# Patient Record
Sex: FEMALE | Race: WHITE | NOT HISPANIC OR LATINO | Employment: UNEMPLOYED | URBAN - METROPOLITAN AREA
[De-identification: names, ages, dates, MRNs, and addresses within clinical notes are randomized per-mention and may not be internally consistent; named-entity substitution may affect disease eponyms.]

---

## 2024-01-03 ENCOUNTER — TELEPHONE (OUTPATIENT)
Dept: FAMILY MEDICINE CLINIC | Facility: CLINIC | Age: 29
End: 2024-01-03

## 2024-01-03 NOTE — TELEPHONE ENCOUNTER
Caller: Chetna Saxena    Relationship: Self    Best call back number: [phone]     What is the medical concern/diagnosis: SKIN CONCERN    What specialty or service is being requested: REFERRAL TO GENERAL SURGEON FOR DERMATOLOGY ISSUE     What is the provider, practice or medical service name: UNKNOWN    What is the office location: LOCAL PREFERRED IF POSSIBLE      PATIENT STATES THAT SHE WENT TO HER DERMATOLOGIST AND THEY TOLD HER SHE NEEDS A REFERRAL TO A GENERAL SURGEON FOR HER DERMATOLOGY ISSUE.

## 2024-01-09 ENCOUNTER — OFFICE VISIT (OUTPATIENT)
Dept: FAMILY MEDICINE CLINIC | Facility: CLINIC | Age: 29
End: 2024-01-09
Payer: COMMERCIAL

## 2024-01-09 VITALS
BODY MASS INDEX: 35.69 KG/M2 | TEMPERATURE: 98.5 F | DIASTOLIC BLOOD PRESSURE: 69 MMHG | HEIGHT: 65 IN | OXYGEN SATURATION: 98 % | SYSTOLIC BLOOD PRESSURE: 125 MMHG | HEART RATE: 89 BPM | WEIGHT: 214.2 LBS | RESPIRATION RATE: 18 BRPM

## 2024-01-09 DIAGNOSIS — E66.01 CLASS 2 SEVERE OBESITY WITH SERIOUS COMORBIDITY AND BODY MASS INDEX (BMI) OF 35.0 TO 35.9 IN ADULT, UNSPECIFIED OBESITY TYPE: ICD-10-CM

## 2024-01-09 DIAGNOSIS — L73.2 HIDRADENITIS SUPPURATIVA: ICD-10-CM

## 2024-01-09 DIAGNOSIS — E53.8 B12 DEFICIENCY: ICD-10-CM

## 2024-01-09 DIAGNOSIS — L40.9 PSORIASIS: ICD-10-CM

## 2024-01-09 DIAGNOSIS — E55.9 VITAMIN D DEFICIENCY: ICD-10-CM

## 2024-01-09 DIAGNOSIS — L65.9 HAIR LOSS: ICD-10-CM

## 2024-01-09 DIAGNOSIS — E88.810 METABOLIC SYNDROME: Primary | ICD-10-CM

## 2024-01-09 LAB
25(OH)D3 SERPL-MCNC: 18.4 NG/ML (ref 30–100)
ALBUMIN SERPL-MCNC: 4.2 G/DL (ref 3.5–5.2)
ALBUMIN/GLOB SERPL: 1.4 G/DL
ALP SERPL-CCNC: 64 U/L (ref 39–117)
ALT SERPL W P-5'-P-CCNC: 35 U/L (ref 1–33)
ANION GAP SERPL CALCULATED.3IONS-SCNC: 6.5 MMOL/L (ref 5–15)
AST SERPL-CCNC: 21 U/L (ref 1–32)
BASOPHILS # BLD AUTO: 0.04 10*3/MM3 (ref 0–0.2)
BASOPHILS NFR BLD AUTO: 0.6 % (ref 0–1.5)
BILIRUB SERPL-MCNC: <0.2 MG/DL (ref 0–1.2)
BUN SERPL-MCNC: 10 MG/DL (ref 6–20)
BUN/CREAT SERPL: 17.5 (ref 7–25)
CALCIUM SPEC-SCNC: 9.7 MG/DL (ref 8.6–10.5)
CHLORIDE SERPL-SCNC: 107 MMOL/L (ref 98–107)
CHOLEST SERPL-MCNC: 161 MG/DL (ref 0–200)
CO2 SERPL-SCNC: 22.5 MMOL/L (ref 22–29)
CREAT SERPL-MCNC: 0.57 MG/DL (ref 0.57–1)
DEPRECATED RDW RBC AUTO: 36.6 FL (ref 37–54)
EGFRCR SERPLBLD CKD-EPI 2021: 127.1 ML/MIN/1.73
EOSINOPHIL # BLD AUTO: 0.22 10*3/MM3 (ref 0–0.4)
EOSINOPHIL NFR BLD AUTO: 3.1 % (ref 0.3–6.2)
ERYTHROCYTE [DISTWIDTH] IN BLOOD BY AUTOMATED COUNT: 11.5 % (ref 12.3–15.4)
FERRITIN SERPL-MCNC: 96.6 NG/ML (ref 13–150)
FOLATE SERPL-MCNC: 8.4 NG/ML (ref 4.78–24.2)
FSH SERPL-ACNC: 5.18 MIU/ML
GLOBULIN UR ELPH-MCNC: 3.1 GM/DL
GLUCOSE SERPL-MCNC: 77 MG/DL (ref 65–99)
HCT VFR BLD AUTO: 38.1 % (ref 34–46.6)
HDLC SERPL-MCNC: 44 MG/DL (ref 40–60)
HGB BLD-MCNC: 12.6 G/DL (ref 12–15.9)
IMM GRANULOCYTES # BLD AUTO: 0.04 10*3/MM3 (ref 0–0.05)
IMM GRANULOCYTES NFR BLD AUTO: 0.6 % (ref 0–0.5)
IRON 24H UR-MRATE: 81 MCG/DL (ref 37–145)
IRON SATN MFR SERPL: 25 % (ref 20–50)
LDLC SERPL CALC-MCNC: 103 MG/DL (ref 0–100)
LDLC/HDLC SERPL: 2.33 {RATIO}
LH SERPL-ACNC: 16.9 MIU/ML
LYMPHOCYTES # BLD AUTO: 2.22 10*3/MM3 (ref 0.7–3.1)
LYMPHOCYTES NFR BLD AUTO: 31.4 % (ref 19.6–45.3)
MCH RBC QN AUTO: 29.5 PG (ref 26.6–33)
MCHC RBC AUTO-ENTMCNC: 33.1 G/DL (ref 31.5–35.7)
MCV RBC AUTO: 89.2 FL (ref 79–97)
MONOCYTES # BLD AUTO: 0.61 10*3/MM3 (ref 0.1–0.9)
MONOCYTES NFR BLD AUTO: 8.6 % (ref 5–12)
NEUTROPHILS NFR BLD AUTO: 3.94 10*3/MM3 (ref 1.7–7)
NEUTROPHILS NFR BLD AUTO: 55.7 % (ref 42.7–76)
NRBC BLD AUTO-RTO: 0 /100 WBC (ref 0–0.2)
PLATELET # BLD AUTO: 306 10*3/MM3 (ref 140–450)
PMV BLD AUTO: 10.1 FL (ref 6–12)
POTASSIUM SERPL-SCNC: 4.3 MMOL/L (ref 3.5–5.2)
PROLACTIN SERPL-MCNC: 12.6 NG/ML (ref 4.79–23.3)
PROT SERPL-MCNC: 7.3 G/DL (ref 6–8.5)
RBC # BLD AUTO: 4.27 10*6/MM3 (ref 3.77–5.28)
SODIUM SERPL-SCNC: 136 MMOL/L (ref 136–145)
T3FREE SERPL-MCNC: 3.59 PG/ML (ref 2–4.4)
T4 FREE SERPL-MCNC: 1.24 NG/DL (ref 0.93–1.7)
TIBC SERPL-MCNC: 320 MCG/DL (ref 298–536)
TRANSFERRIN SERPL-MCNC: 215 MG/DL (ref 200–360)
TRIGL SERPL-MCNC: 72 MG/DL (ref 0–150)
TSH SERPL DL<=0.05 MIU/L-ACNC: 1.03 UIU/ML (ref 0.27–4.2)
VIT B12 BLD-MCNC: 265 PG/ML (ref 211–946)
VLDLC SERPL-MCNC: 14 MG/DL (ref 5–40)
WBC NRBC COR # BLD AUTO: 7.07 10*3/MM3 (ref 3.4–10.8)

## 2024-01-09 PROCEDURE — 82728 ASSAY OF FERRITIN: CPT | Performed by: NURSE PRACTITIONER

## 2024-01-09 PROCEDURE — 83540 ASSAY OF IRON: CPT | Performed by: NURSE PRACTITIONER

## 2024-01-09 PROCEDURE — 80053 COMPREHEN METABOLIC PANEL: CPT | Performed by: NURSE PRACTITIONER

## 2024-01-09 PROCEDURE — 83525 ASSAY OF INSULIN: CPT | Performed by: NURSE PRACTITIONER

## 2024-01-09 PROCEDURE — 83002 ASSAY OF GONADOTROPIN (LH): CPT | Performed by: NURSE PRACTITIONER

## 2024-01-09 PROCEDURE — 80061 LIPID PANEL: CPT | Performed by: NURSE PRACTITIONER

## 2024-01-09 PROCEDURE — 83527 ASSAY OF INSULIN: CPT | Performed by: NURSE PRACTITIONER

## 2024-01-09 PROCEDURE — 86376 MICROSOMAL ANTIBODY EACH: CPT | Performed by: NURSE PRACTITIONER

## 2024-01-09 PROCEDURE — 84439 ASSAY OF FREE THYROXINE: CPT | Performed by: NURSE PRACTITIONER

## 2024-01-09 PROCEDURE — 82607 VITAMIN B-12: CPT | Performed by: NURSE PRACTITIONER

## 2024-01-09 PROCEDURE — 82746 ASSAY OF FOLIC ACID SERUM: CPT | Performed by: NURSE PRACTITIONER

## 2024-01-09 PROCEDURE — 84146 ASSAY OF PROLACTIN: CPT | Performed by: NURSE PRACTITIONER

## 2024-01-09 PROCEDURE — 84466 ASSAY OF TRANSFERRIN: CPT | Performed by: NURSE PRACTITIONER

## 2024-01-09 PROCEDURE — 84443 ASSAY THYROID STIM HORMONE: CPT | Performed by: NURSE PRACTITIONER

## 2024-01-09 PROCEDURE — 85025 COMPLETE CBC W/AUTO DIFF WBC: CPT | Performed by: NURSE PRACTITIONER

## 2024-01-09 PROCEDURE — 83001 ASSAY OF GONADOTROPIN (FSH): CPT | Performed by: NURSE PRACTITIONER

## 2024-01-09 PROCEDURE — 84402 ASSAY OF FREE TESTOSTERONE: CPT | Performed by: NURSE PRACTITIONER

## 2024-01-09 PROCEDURE — 82306 VITAMIN D 25 HYDROXY: CPT | Performed by: NURSE PRACTITIONER

## 2024-01-09 PROCEDURE — 84481 FREE ASSAY (FT-3): CPT | Performed by: NURSE PRACTITIONER

## 2024-01-09 PROCEDURE — 86800 THYROGLOBULIN ANTIBODY: CPT | Performed by: NURSE PRACTITIONER

## 2024-01-09 PROCEDURE — 82627 DEHYDROEPIANDROSTERONE: CPT | Performed by: NURSE PRACTITIONER

## 2024-01-09 NOTE — PROGRESS NOTES
Chief Complaint  Obesity    Subjective          Cehtna Constance Woody presents to [facility]  History of Present Illness  She is here for referral to general surgery.  She has an area in the right axillary that dermatology told her she needed to get extracted.  She is on Humira for the psoriasis along with the hidradenitis.  She has also had weight gain since being placed back on the Humira in November.  She was down to 200 and now she is up to 214.  She has not changed her eating habits or her diet at all.  She did get  about a year ago but nothing is changed since then except for the Humira.  She is willing to learn about the gastric sleeve.  She is aware passport does not currently pay for any weight loss injections.  She has been on phentermine in the past and did lose weight but gained it back.  She is not smoking but she is using a 2% nicotine vapes.    She is also having a lot of hair loss.  She is is having the hair loss more at the back of the scalp.  She does have a history of B12 and D deficiency.  She also has a history of anemia in the past.  She has not had a cycle since 2021.  Depression: Not at risk (1/9/2024)    PHQ-2     PHQ-2 Score: 0    and 1/9/2024    Class 2 Severe Obesity (BMI >=35 and <=39.9). Obesity-related health conditions include the following:  derm issues . Obesity is worsening. BMI is is above average; BMI management plan is completed. We discussed low calorie, low carb based diet program, portion control, increasing exercise, and consulting a Bariatric surgeon.         Allergies  Erythromycin and Sulfamethoxazole-trimethoprim    Social History     Tobacco Use    Smoking status: Every Day     Packs/day: 1.00     Years: 10.00     Additional pack years: 0.00     Total pack years: 10.00     Types: Cigarettes     Start date: 2010    Smokeless tobacco: Never   Vaping Use    Vaping Use: Never used   Substance Use Topics    Alcohol use: Yes     Comment:  "Social     Drug use: Never       Family History   Problem Relation Age of Onset    Anxiety disorder Mother     Hypothyroidism Mother     Heart disease Mother     Other Father         Renal Calculus    Anxiety disorder Sister     Hypothyroidism Sister     Hypothyroidism Maternal Grandmother     Diabetes type II Paternal Grandmother     Diabetes type II Paternal Grandfather     Hyperlipidemia Paternal Grandfather     Hypertension Paternal Grandfather     Hypothyroidism Other         Health Maintenance Due   Topic Date Due    PAP SMEAR  06/24/2021    BMI FOLLOWUP  12/14/2023        Immunization History   Administered Date(s) Administered    COVID-19 (MODERNA) 1st,2nd,3rd Dose Monovalent 04/30/2021, 05/28/2021    DTP / HiB 01/29/1996    DTaP, Unspecified 07/29/1999    Fluzone (or Fluarix & Flulaval for VFC) >6mos 11/11/2014    Hep A, 2 Dose 02/13/2007    Hep B, Adolescent or Pediatric 08/13/1996    Influenza TIV (IM) 11/11/2014    MMR 07/29/1999, 08/30/1999    Meningococcal Conjugate 02/13/2007    OPV 01/29/1996, 07/29/1999    PEDS-Pneumococcal Conjugate (PCV7) 11/11/2014    Pneumococcal Conjugate 13-Valent (PCV13) 11/11/2014    Tdap 12/27/2021    Varicella 05/18/2001, 02/13/2007       Review of Systems   Constitutional:  Positive for unexpected weight gain. Negative for fatigue.   Respiratory:  Negative for cough and shortness of breath.    Cardiovascular:  Negative for chest pain.   Gastrointestinal:  Negative for diarrhea, nausea and vomiting.        Objective       Vitals:    01/09/24 0914   BP: 125/69   Pulse: 89   Resp: 18   Temp: 98.5 °F (36.9 °C)   SpO2: 98%   Weight: 97.2 kg (214 lb 3.2 oz)   Height: 165.1 cm (65\")       Body mass index is 35.64 kg/m².         Physical Exam  Vitals reviewed.   Constitutional:       Appearance: Normal appearance. She is well-developed. She is obese.   Cardiovascular:      Rate and Rhythm: Normal rate and regular rhythm.      Heart sounds: Normal heart sounds. No murmur " heard.  Pulmonary:      Effort: Pulmonary effort is normal.      Breath sounds: Normal breath sounds.   Neurological:      Mental Status: She is alert and oriented to person, place, and time.      Cranial Nerves: No cranial nerve deficit.      Motor: No weakness.   Psychiatric:         Mood and Affect: Mood and affect normal.             Result Review :     The following data was reviewed by: PHYLICIA Pat on 01/09/2024:    Common Labs   Common labs          2/14/2023    10:22   Common Labs   Glucose 65    BUN 15    Creatinine 0.61    Sodium 137    Potassium 4.3    Chloride 103    Calcium 9.4    Albumin 4.3    Total Bilirubin <0.2    Alkaline Phosphatase 60    AST (SGOT) 22    ALT (SGPT) 26    WBC 6.33    Hemoglobin 13.7    Hematocrit 41.1    Platelets 237    Total Cholesterol 142    Triglycerides 84    HDL Cholesterol 46    LDL Cholesterol  80                     Assessment and Plan      Diagnoses and all orders for this visit:    1. Metabolic syndrome (Primary)  -     Comprehensive Metabolic Panel  -     CBC & Differential  -     TSH  -     Lipid Panel  -     DHEA-sulfate  -     Follicle stimulating hormone  -     Luteinizing hormone  -     Prolactin  -     Testosterone Free Direct  -     Insulin, Free & Total, Serum    2. Hidradenitis suppurativa  -     Ambulatory Referral to General Surgery    3. Psoriasis  -     Ambulatory Referral to General Surgery    4. Class 2 severe obesity with serious comorbidity and body mass index (BMI) of 35.0 to 35.9 in adult, unspecified obesity type    5. Hair loss  -     Vitamin D,25-Hydroxy  -     Vitamin B12 & Folate  -     T4, Free  -     T3, Free  -     Thyroid Antibodies  -     DHEA-sulfate  -     Follicle stimulating hormone  -     Luteinizing hormone  -     Prolactin  -     Testosterone Free Direct    6. Vitamin D deficiency  -     Vitamin D,25-Hydroxy    7. B12 deficiency  -     Vitamin B12 & Folate  -     Ferritin  -     Iron Profile            Follow Up      Return if symptoms worsen or fail to improve.  We will do a consultation with general surgery for the axillary area.  She will do the bariatric video and go from there to see if she qualifies for bariatric surgery.  We did discuss that she would need to be on a 1400 to 1600-calorie diet with exercising 10 to 15 minutes a day other that is a brisk walk or on the treadmill to incorporate exercise.  We will check labs for the hair loss.  Continue seeing dermatology.  Call with questions or concerns.  Patient was given instructions and counseling regarding her condition or for health maintenance advice. Please see specific information pulled into the AVS if appropriate.     Parts of this note are electronic transcriptions/translations of spoken language to printed text using the Dragon Dictation system.          Pallavi Koch, APRN  01/09/2024

## 2024-01-10 LAB
DHEA-S SERPL-MCNC: 189 UG/DL (ref 84.8–378)
THYROGLOB AB SERPL-ACNC: <1 IU/ML (ref 0–0.9)
THYROPEROXIDASE AB SERPL-ACNC: <9 IU/ML (ref 0–34)

## 2024-01-11 RX ORDER — ADALIMUMAB 80MG/0.8ML
KIT SUBCUTANEOUS
COMMUNITY
Start: 2023-12-26

## 2024-01-12 ENCOUNTER — OFFICE VISIT (OUTPATIENT)
Dept: SURGERY | Facility: CLINIC | Age: 29
End: 2024-01-12
Payer: COMMERCIAL

## 2024-01-12 VITALS — HEIGHT: 65 IN | RESPIRATION RATE: 16 BRPM | WEIGHT: 212 LBS | BODY MASS INDEX: 35.32 KG/M2

## 2024-01-12 DIAGNOSIS — L73.2 HIDRADENITIS SUPPURATIVA: ICD-10-CM

## 2024-01-12 DIAGNOSIS — Z72.0 TOBACCO USE: Primary | ICD-10-CM

## 2024-01-12 PROCEDURE — 1160F RVW MEDS BY RX/DR IN RCRD: CPT | Performed by: SURGERY

## 2024-01-12 PROCEDURE — 1159F MED LIST DOCD IN RCRD: CPT | Performed by: SURGERY

## 2024-01-12 PROCEDURE — 99203 OFFICE O/P NEW LOW 30 MIN: CPT | Performed by: SURGERY

## 2024-01-12 RX ORDER — SODIUM CHLORIDE 0.9 % (FLUSH) 0.9 %
10 SYRINGE (ML) INJECTION EVERY 12 HOURS SCHEDULED
OUTPATIENT
Start: 2024-01-12

## 2024-01-12 RX ORDER — ONDANSETRON 2 MG/ML
4 INJECTION INTRAMUSCULAR; INTRAVENOUS EVERY 6 HOURS PRN
OUTPATIENT
Start: 2024-01-12

## 2024-01-12 RX ORDER — SODIUM CHLORIDE 0.9 % (FLUSH) 0.9 %
10 SYRINGE (ML) INJECTION AS NEEDED
OUTPATIENT
Start: 2024-01-12

## 2024-01-12 RX ORDER — SODIUM CHLORIDE, SODIUM LACTATE, POTASSIUM CHLORIDE, CALCIUM CHLORIDE 600; 310; 30; 20 MG/100ML; MG/100ML; MG/100ML; MG/100ML
70 INJECTION, SOLUTION INTRAVENOUS CONTINUOUS
OUTPATIENT
Start: 2024-01-12

## 2024-01-12 RX ORDER — SODIUM CHLORIDE 9 MG/ML
40 INJECTION, SOLUTION INTRAVENOUS AS NEEDED
OUTPATIENT
Start: 2024-01-12

## 2024-01-12 NOTE — H&P (VIEW-ONLY)
Inpatient History and Physical Surgical Orders    Preadmission Location:   Preadmission Time:  Facility:  Surgery Date:  Surgery Time:  Preadmission Test date:     Chief Complaint  Outpatient History and Physical / Surgical Orders    Primary Care Provider: Pallavi Koch APRN    Referring Provider: Pallavi Koch, *    Subjective      Patient Name: Chetna Woody : 1995    HPI  The patient is a 28-year-old female who presents with chronic problems with right axillary hidradenitis suppurativa.  She has been on some Humira recently but continues to have quite a bit of drainage and discomfort.  She is also had steroid pulses without significant improvement in the wound drainage.    Past History:  Medical History: has a past medical history of Amenorrhea, Anemia, Boils, Congenital meatal stenosis, Constipation, Glands swollen, Headache, Heart burn, Hidradenitis, Hidradenitis suppurativa, IC (interstitial cystitis), Insulin resistance (2018), and Psoriasis.   Surgical History: has a past surgical history that includes Adenoidectomy; Cervix surgery ();  section (); Cystoscopy; Salpingectomy (2021); Tonsillectomy (); Urethral Dilatation; Laser ablation (2021); [place] tooth extraction (2017); Tubal ligation; d & c hysteroscopy endometrial ablation; and Ablation colpoclesis.   Family History: family history includes Anxiety disorder in her mother and sister; Diabetes type II in her paternal grandfather and paternal grandmother; Heart disease in her mother; Hyperlipidemia in her paternal grandfather; Hypertension in her paternal grandfather; Hypothyroidism in her maternal grandmother, mother, sister, and another family member; Other in her father.   Social History: reports that she has been smoking cigarettes. She started smoking about 14 years ago. She has a 10.00 pack-year smoking history. She has never used smokeless tobacco. She reports current alcohol use.  "She reports that she does not use drugs.  Allergies: Erythromycin and Sulfamethoxazole-trimethoprim       Current Outpatient Medications:     Adalimumab (HUMIRA PEN SC), Inject 80 mcg under the skin into the appropriate area as directed Every 14 (Fourteen) Days., Disp: , Rfl:     Humira Pen 80 MG/0.8ML injection, INJECT 80MG (1 PEN) SUBCUTANEOUSLY EVERY 2 WEEKS, Disp: , Rfl:        Objective   Vital Signs:   Resp 16   Ht 165.1 cm (65\")   Wt 96.2 kg (212 lb)   BMI 35.28 kg/m²       Physical Exam  Vitals and nursing note reviewed.   Constitutional:       Appearance: Normal appearance. The patient is well-developed.   Cardiovascular:      Rate and Rhythm: Normal rate and regular rhythm.   Pulmonary:      Effort: Pulmonary effort is normal.      Breath sounds: Normal air entry.   Abdominal:      General: Bowel sounds are normal.      Palpations: Abdomen is soft.      Skin:     General: Skin is warm and dry.   Neurological:      Mental Status: The patient is alert and oriented to person, place, and time.      Motor: Motor function is intact.   Psychiatric:         Mood and Affect: Mood normal.   Axilla: She has extensive right axillary hidradenitis changes.    Result Review :               Assessment and Plan   Diagnoses and all orders for this visit:    1. Tobacco use (Primary)    2. Hidradenitis suppurativa  -     Case Request; Standing  -     Follow Anesthesia Guidelines / Protocol; Standing  -     Verify NPO Status; Standing  -     Obtain Informed Consent; Standing  -     Verify / Perform Chlorhexidine Skin Prep; Standing  -     Verify / Perform Chlorhexidine Skin Prep if Indicated (If Not Already Completed); Standing  -     Insert Peripheral IV; Standing  -     Saline Lock & Maintain IV Access; Standing  -     sodium chloride 0.9 % flush 10 mL  -     sodium chloride 0.9 % flush 10 mL  -     sodium chloride 0.9 % infusion 40 mL  -     lactated ringers infusion  -     ondansetron (ZOFRAN) injection 4 mg  -     " ceFAZolin (ANCEF) 2,000 mg in sodium chloride 0.9 % 100 mL IVPB  -     Case Request    I think it would be reasonable to consider a wide excision of this right axillary hidradenitis.  She appears to have multiple draining sinuses and suspect she has a lot of subcutaneous tunneling disease there.  I have told her that we will likely have to treat that with dressing changes after surgery and just plan on having it closed by secondary intention.  I have described the procedure to her as well as the risk and benefits and she is agreeable to proceeding.    I  Mart Frederick MD  01/12/2024

## 2024-01-12 NOTE — PROGRESS NOTES
Inpatient History and Physical Surgical Orders    Preadmission Location:   Preadmission Time:  Facility:  Surgery Date:  Surgery Time:  Preadmission Test date:     Chief Complaint  Outpatient History and Physical / Surgical Orders    Primary Care Provider: Pallavi Koch APRN    Referring Provider: Pallavi Koch, *    Subjective      Patient Name: Chetna Woody : 1995    HPI  The patient is a 28-year-old female who presents with chronic problems with right axillary hidradenitis suppurativa.  She has been on some Humira recently but continues to have quite a bit of drainage and discomfort.  She is also had steroid pulses without significant improvement in the wound drainage.    Past History:  Medical History: has a past medical history of Amenorrhea, Anemia, Boils, Congenital meatal stenosis, Constipation, Glands swollen, Headache, Heart burn, Hidradenitis, Hidradenitis suppurativa, IC (interstitial cystitis), Insulin resistance (2018), and Psoriasis.   Surgical History: has a past surgical history that includes Adenoidectomy; Cervix surgery ();  section (); Cystoscopy; Salpingectomy (2021); Tonsillectomy (); Urethral Dilatation; Laser ablation (2021); [place] tooth extraction (2017); Tubal ligation; d & c hysteroscopy endometrial ablation; and Ablation colpoclesis.   Family History: family history includes Anxiety disorder in her mother and sister; Diabetes type II in her paternal grandfather and paternal grandmother; Heart disease in her mother; Hyperlipidemia in her paternal grandfather; Hypertension in her paternal grandfather; Hypothyroidism in her maternal grandmother, mother, sister, and another family member; Other in her father.   Social History: reports that she has been smoking cigarettes. She started smoking about 14 years ago. She has a 10.00 pack-year smoking history. She has never used smokeless tobacco. She reports current alcohol use.  "She reports that she does not use drugs.  Allergies: Erythromycin and Sulfamethoxazole-trimethoprim       Current Outpatient Medications:     Adalimumab (HUMIRA PEN SC), Inject 80 mcg under the skin into the appropriate area as directed Every 14 (Fourteen) Days., Disp: , Rfl:     Humira Pen 80 MG/0.8ML injection, INJECT 80MG (1 PEN) SUBCUTANEOUSLY EVERY 2 WEEKS, Disp: , Rfl:        Objective   Vital Signs:   Resp 16   Ht 165.1 cm (65\")   Wt 96.2 kg (212 lb)   BMI 35.28 kg/m²       Physical Exam  Vitals and nursing note reviewed.   Constitutional:       Appearance: Normal appearance. The patient is well-developed.   Cardiovascular:      Rate and Rhythm: Normal rate and regular rhythm.   Pulmonary:      Effort: Pulmonary effort is normal.      Breath sounds: Normal air entry.   Abdominal:      General: Bowel sounds are normal.      Palpations: Abdomen is soft.      Skin:     General: Skin is warm and dry.   Neurological:      Mental Status: The patient is alert and oriented to person, place, and time.      Motor: Motor function is intact.   Psychiatric:         Mood and Affect: Mood normal.   Axilla: She has extensive right axillary hidradenitis changes.    Result Review :               Assessment and Plan   Diagnoses and all orders for this visit:    1. Tobacco use (Primary)    2. Hidradenitis suppurativa  -     Case Request; Standing  -     Follow Anesthesia Guidelines / Protocol; Standing  -     Verify NPO Status; Standing  -     Obtain Informed Consent; Standing  -     Verify / Perform Chlorhexidine Skin Prep; Standing  -     Verify / Perform Chlorhexidine Skin Prep if Indicated (If Not Already Completed); Standing  -     Insert Peripheral IV; Standing  -     Saline Lock & Maintain IV Access; Standing  -     sodium chloride 0.9 % flush 10 mL  -     sodium chloride 0.9 % flush 10 mL  -     sodium chloride 0.9 % infusion 40 mL  -     lactated ringers infusion  -     ondansetron (ZOFRAN) injection 4 mg  -     " ceFAZolin (ANCEF) 2,000 mg in sodium chloride 0.9 % 100 mL IVPB  -     Case Request    I think it would be reasonable to consider a wide excision of this right axillary hidradenitis.  She appears to have multiple draining sinuses and suspect she has a lot of subcutaneous tunneling disease there.  I have told her that we will likely have to treat that with dressing changes after surgery and just plan on having it closed by secondary intention.  I have described the procedure to her as well as the risk and benefits and she is agreeable to proceeding.    I  Mart Frederick MD  01/12/2024

## 2024-01-17 ENCOUNTER — ANESTHESIA EVENT (OUTPATIENT)
Dept: PERIOP | Facility: HOSPITAL | Age: 29
End: 2024-01-17
Payer: COMMERCIAL

## 2024-01-17 LAB
INSULIN FREE SERPL-ACNC: 9 UU/ML
INSULIN SERPL-ACNC: 9 UU/ML

## 2024-01-17 RX ORDER — UBIDECARENONE 75 MG
50 CAPSULE ORAL DAILY
COMMUNITY

## 2024-01-17 RX ORDER — MELATONIN
1000 DAILY
COMMUNITY

## 2024-01-17 NOTE — PRE-PROCEDURE INSTRUCTIONS
PATIENT INSTRUCTED TO BE:    - NOTHING TO EAT AFTER MIDNIGHT OR CHEW, EXCEPT CAN HAVE CLEAR LIQUIDS 2 HOURS PRIOR TO SURGERY ARRIVAL TIME     - TO HOLD ALL VITAMINS, SUPPLEMENTS, NSAIDS FOR ONE WEEK PRIOR TO THEIR SURGICAL PROCEDURE    - DO NOT TAKE __--------____ 7 DAYS PRIOR TO PROCEDURE PER ANESTHESIA RECOMMENDATIONS/INSTRUCTIONS     - INSTRUCTED PT TO USE SURGICAL SOAP 1 TIME THE NIGHT PRIOR TO SURGERY OR THE AM OF SURGERY.   USE SOAP FROM NECK TO TOES AVOID THEIR FACE, HAIR, AND PRIVATE PARTS. INSTRUCTED NO LOTIONS, JEWELRY, PIERCINGS, OR DEODORANT DAY OF SURGERY    - IF DIABETIC, CHECK BLOOD GLUCOSE IF LESS THAN 70 OR HAVING SYMPTOMS CALL THE PREOP AREA FOR INSTRUCTIONS ON AM OF SURGERY ([phone])    -INSTRUCTED TO TAKE THE FOLLOWING MEDICATIONS THE DAY OF SURGERY:        NONE      - DO NOT BRING ANY MEDICATIONS WITH YOU TO THE HOSPITAL THE DAY OF SURGERY, EXCEPT IF USE INHALERS. BRING INHALERS DAY OF SURGERY       - BRING CPAP OR BIPAP TO THE HOSPITAL ONLY IF ARE SPENDING THE NIGHT    - DO NOT SMOKE OR VAPE 24 HOURS PRIOR TO PROCEDURE PER ANESTHESIA REQUEST     -MAKE SURE YOU HAVE A RIDE HOME OR SOMEONE TO STAY WITH YOU THE DAY OF THE PROCEDURE AFTER YOU GO HOME    - FOLLOW ANY OTHER INSTRUCTIONS GIVEN TO YOU BY YOUR SURGEON'S OFFICE.     - PREADMISSION TESTING NURSE- OLLIE -[phone] IF HAVE ANY QUESTIONS     PATIENT PROVIDED THE NUMBER FOR PREOP SURGICAL DEPT IF HAD QUESTIONS AFTER HOURS PRIOR TO SURGERY ([phone])  INFORMED PT IF NO ANSWER, LEAVE A MESSAGE AND SOMEONE WILL RETURN THEIR CALL       PATIENT VERBALIZED UNDERSTANDING

## 2024-01-18 ENCOUNTER — ANESTHESIA (OUTPATIENT)
Dept: PERIOP | Facility: HOSPITAL | Age: 29
End: 2024-01-18
Payer: COMMERCIAL

## 2024-01-18 ENCOUNTER — HOSPITAL ENCOUNTER (OUTPATIENT)
Facility: HOSPITAL | Age: 29
Setting detail: HOSPITAL OUTPATIENT SURGERY
Discharge: HOME | End: 2024-01-18
Attending: SURGERY | Admitting: SURGERY
Payer: COMMERCIAL

## 2024-01-18 VITALS
HEIGHT: 65 IN | OXYGEN SATURATION: 98 % | SYSTOLIC BLOOD PRESSURE: 144 MMHG | WEIGHT: 214.29 LBS | TEMPERATURE: 98.8 F | RESPIRATION RATE: 19 BRPM | HEART RATE: 79 BPM | BODY MASS INDEX: 35.7 KG/M2 | DIASTOLIC BLOOD PRESSURE: 82 MMHG

## 2024-01-18 DIAGNOSIS — L73.2 HIDRADENITIS SUPPURATIVA: ICD-10-CM

## 2024-01-18 DIAGNOSIS — L73.2 HIDRADENITIS SUPPURATIVA: Primary | ICD-10-CM

## 2024-01-18 LAB — TESTOST FREE SERPL-MCNC: 1.2 PG/ML (ref 0–4.2)

## 2024-01-18 PROCEDURE — 25010000002 ONDANSETRON PER 1 MG: Performed by: NURSE ANESTHETIST, CERTIFIED REGISTERED

## 2024-01-18 PROCEDURE — 25010000002 BUPIVACAINE (PF) 0.25 % SOLUTION: Performed by: SURGERY

## 2024-01-18 PROCEDURE — 25010000002 DEXAMETHASONE PER 1 MG: Performed by: NURSE ANESTHETIST, CERTIFIED REGISTERED

## 2024-01-18 PROCEDURE — 25810000003 LACTATED RINGERS PER 1000 ML: Performed by: ANESTHESIOLOGY

## 2024-01-18 PROCEDURE — 25010000002 CEFAZOLIN IN DEXTROSE 2000 MG/ 100 ML SOLUTION: Performed by: SURGERY

## 2024-01-18 PROCEDURE — 88304 TISSUE EXAM BY PATHOLOGIST: CPT | Performed by: SURGERY

## 2024-01-18 PROCEDURE — 25010000002 HYDROMORPHONE 1 MG/ML SOLUTION: Performed by: NURSE ANESTHETIST, CERTIFIED REGISTERED

## 2024-01-18 PROCEDURE — 25010000002 FENTANYL CITRATE (PF) 50 MCG/ML SOLUTION: Performed by: NURSE ANESTHETIST, CERTIFIED REGISTERED

## 2024-01-18 PROCEDURE — 25010000002 DROPERIDOL PER 5 MG: Performed by: ANESTHESIOLOGY

## 2024-01-18 PROCEDURE — 25010000002 PROPOFOL 10 MG/ML EMULSION: Performed by: NURSE ANESTHETIST, CERTIFIED REGISTERED

## 2024-01-18 PROCEDURE — 25010000002 MIDAZOLAM PER 1MG: Performed by: ANESTHESIOLOGY

## 2024-01-18 PROCEDURE — 25010000002 KETOROLAC TROMETHAMINE PER 15 MG: Performed by: NURSE ANESTHETIST, CERTIFIED REGISTERED

## 2024-01-18 PROCEDURE — 11450 EXC SKN HDRDNT AX SMPL/NTRM: CPT | Performed by: SURGERY

## 2024-01-18 RX ORDER — SODIUM CHLORIDE, SODIUM LACTATE, POTASSIUM CHLORIDE, CALCIUM CHLORIDE 600; 310; 30; 20 MG/100ML; MG/100ML; MG/100ML; MG/100ML
9 INJECTION, SOLUTION INTRAVENOUS CONTINUOUS PRN
Status: DISCONTINUED | OUTPATIENT
Start: 2024-01-18 | End: 2024-01-18 | Stop reason: HOSPADM

## 2024-01-18 RX ORDER — ONDANSETRON 2 MG/ML
4 INJECTION INTRAMUSCULAR; INTRAVENOUS EVERY 6 HOURS PRN
Status: DISCONTINUED | OUTPATIENT
Start: 2024-01-18 | End: 2024-01-18 | Stop reason: HOSPADM

## 2024-01-18 RX ORDER — HYDROCODONE BITARTRATE AND ACETAMINOPHEN 5; 325 MG/1; MG/1
1 TABLET ORAL ONCE AS NEEDED
Status: DISCONTINUED | OUTPATIENT
Start: 2024-01-18 | End: 2024-01-18 | Stop reason: HOSPADM

## 2024-01-18 RX ORDER — BUPIVACAINE HYDROCHLORIDE 2.5 MG/ML
INJECTION, SOLUTION EPIDURAL; INFILTRATION; INTRACAUDAL AS NEEDED
Status: DISCONTINUED | OUTPATIENT
Start: 2024-01-18 | End: 2024-01-18 | Stop reason: HOSPADM

## 2024-01-18 RX ORDER — MAGNESIUM HYDROXIDE 1200 MG/15ML
LIQUID ORAL AS NEEDED
Status: DISCONTINUED | OUTPATIENT
Start: 2024-01-18 | End: 2024-01-18 | Stop reason: HOSPADM

## 2024-01-18 RX ORDER — SODIUM CHLORIDE 0.9 % (FLUSH) 0.9 %
10 SYRINGE (ML) INJECTION EVERY 12 HOURS SCHEDULED
Status: DISCONTINUED | OUTPATIENT
Start: 2024-01-18 | End: 2024-01-18 | Stop reason: HOSPADM

## 2024-01-18 RX ORDER — PROPOFOL 10 MG/ML
VIAL (ML) INTRAVENOUS AS NEEDED
Status: DISCONTINUED | OUTPATIENT
Start: 2024-01-18 | End: 2024-01-18 | Stop reason: SURG

## 2024-01-18 RX ORDER — ONDANSETRON 4 MG/1
4 TABLET, ORALLY DISINTEGRATING ORAL ONCE AS NEEDED
Status: DISCONTINUED | OUTPATIENT
Start: 2024-01-18 | End: 2024-01-18 | Stop reason: SDUPTHER

## 2024-01-18 RX ORDER — SODIUM CHLORIDE 0.9 % (FLUSH) 0.9 %
10 SYRINGE (ML) INJECTION AS NEEDED
Status: DISCONTINUED | OUTPATIENT
Start: 2024-01-18 | End: 2024-01-18 | Stop reason: HOSPADM

## 2024-01-18 RX ORDER — MIDAZOLAM HYDROCHLORIDE 2 MG/2ML
2 INJECTION, SOLUTION INTRAMUSCULAR; INTRAVENOUS ONCE
Status: COMPLETED | OUTPATIENT
Start: 2024-01-18 | End: 2024-01-18

## 2024-01-18 RX ORDER — OXYCODONE HYDROCHLORIDE 5 MG/1
5 TABLET ORAL
Status: DISCONTINUED | OUTPATIENT
Start: 2024-01-18 | End: 2024-01-18 | Stop reason: HOSPADM

## 2024-01-18 RX ORDER — CEFAZOLIN SODIUM 2 G/100ML
2000 INJECTION, SOLUTION INTRAVENOUS ONCE
Status: COMPLETED | OUTPATIENT
Start: 2024-01-18 | End: 2024-01-18

## 2024-01-18 RX ORDER — SODIUM CHLORIDE, SODIUM LACTATE, POTASSIUM CHLORIDE, CALCIUM CHLORIDE 600; 310; 30; 20 MG/100ML; MG/100ML; MG/100ML; MG/100ML
70 INJECTION, SOLUTION INTRAVENOUS CONTINUOUS
Status: DISCONTINUED | OUTPATIENT
Start: 2024-01-18 | End: 2024-01-18 | Stop reason: HOSPADM

## 2024-01-18 RX ORDER — PROMETHAZINE HYDROCHLORIDE 12.5 MG/1
25 TABLET ORAL ONCE AS NEEDED
Status: DISCONTINUED | OUTPATIENT
Start: 2024-01-18 | End: 2024-01-18 | Stop reason: HOSPADM

## 2024-01-18 RX ORDER — MEPERIDINE HYDROCHLORIDE 25 MG/ML
12.5 INJECTION INTRAMUSCULAR; INTRAVENOUS; SUBCUTANEOUS
Status: DISCONTINUED | OUTPATIENT
Start: 2024-01-18 | End: 2024-01-18 | Stop reason: HOSPADM

## 2024-01-18 RX ORDER — ONDANSETRON 2 MG/ML
4 INJECTION INTRAMUSCULAR; INTRAVENOUS ONCE AS NEEDED
Status: DISCONTINUED | OUTPATIENT
Start: 2024-01-18 | End: 2024-01-18 | Stop reason: HOSPADM

## 2024-01-18 RX ORDER — KETOROLAC TROMETHAMINE 30 MG/ML
INJECTION, SOLUTION INTRAMUSCULAR; INTRAVENOUS AS NEEDED
Status: DISCONTINUED | OUTPATIENT
Start: 2024-01-18 | End: 2024-01-18 | Stop reason: SURG

## 2024-01-18 RX ORDER — PROMETHAZINE HYDROCHLORIDE 25 MG/1
25 SUPPOSITORY RECTAL ONCE AS NEEDED
Status: DISCONTINUED | OUTPATIENT
Start: 2024-01-18 | End: 2024-01-18 | Stop reason: HOSPADM

## 2024-01-18 RX ORDER — IBUPROFEN 600 MG/1
600 TABLET ORAL EVERY 6 HOURS PRN
Status: DISCONTINUED | OUTPATIENT
Start: 2024-01-18 | End: 2024-01-18 | Stop reason: HOSPADM

## 2024-01-18 RX ORDER — HYDROCODONE BITARTRATE AND ACETAMINOPHEN 5; 325 MG/1; MG/1
1 TABLET ORAL EVERY 6 HOURS PRN
Qty: 30 TABLET | Refills: 0 | Status: SHIPPED | OUTPATIENT
Start: 2024-01-18

## 2024-01-18 RX ORDER — DEXAMETHASONE SODIUM PHOSPHATE 4 MG/ML
INJECTION, SOLUTION INTRA-ARTICULAR; INTRALESIONAL; INTRAMUSCULAR; INTRAVENOUS; SOFT TISSUE AS NEEDED
Status: DISCONTINUED | OUTPATIENT
Start: 2024-01-18 | End: 2024-01-18 | Stop reason: SURG

## 2024-01-18 RX ORDER — DROPERIDOL 2.5 MG/ML
0.62 INJECTION, SOLUTION INTRAMUSCULAR; INTRAVENOUS ONCE
Status: COMPLETED | OUTPATIENT
Start: 2024-01-18 | End: 2024-01-18

## 2024-01-18 RX ORDER — SCOLOPAMINE TRANSDERMAL SYSTEM 1 MG/1
1 PATCH, EXTENDED RELEASE TRANSDERMAL ONCE
Status: DISCONTINUED | OUTPATIENT
Start: 2024-01-18 | End: 2024-01-18 | Stop reason: HOSPADM

## 2024-01-18 RX ORDER — ONDANSETRON 2 MG/ML
INJECTION INTRAMUSCULAR; INTRAVENOUS AS NEEDED
Status: DISCONTINUED | OUTPATIENT
Start: 2024-01-18 | End: 2024-01-18 | Stop reason: SURG

## 2024-01-18 RX ORDER — ACETAMINOPHEN 500 MG
1000 TABLET ORAL ONCE
Status: COMPLETED | OUTPATIENT
Start: 2024-01-18 | End: 2024-01-18

## 2024-01-18 RX ORDER — SODIUM CHLORIDE 9 MG/ML
40 INJECTION, SOLUTION INTRAVENOUS AS NEEDED
Status: DISCONTINUED | OUTPATIENT
Start: 2024-01-18 | End: 2024-01-18 | Stop reason: HOSPADM

## 2024-01-18 RX ORDER — FENTANYL CITRATE 50 UG/ML
INJECTION, SOLUTION INTRAMUSCULAR; INTRAVENOUS AS NEEDED
Status: DISCONTINUED | OUTPATIENT
Start: 2024-01-18 | End: 2024-01-18 | Stop reason: SURG

## 2024-01-18 RX ORDER — LIDOCAINE HYDROCHLORIDE 20 MG/ML
INJECTION, SOLUTION EPIDURAL; INFILTRATION; INTRACAUDAL; PERINEURAL AS NEEDED
Status: DISCONTINUED | OUTPATIENT
Start: 2024-01-18 | End: 2024-01-18 | Stop reason: SURG

## 2024-01-18 RX ADMIN — SCOPALAMINE 1 PATCH: 1 PATCH, EXTENDED RELEASE TRANSDERMAL at 09:35

## 2024-01-18 RX ADMIN — HYDROMORPHONE HYDROCHLORIDE 0.5 MG: 1 INJECTION, SOLUTION INTRAMUSCULAR; INTRAVENOUS; SUBCUTANEOUS at 10:53

## 2024-01-18 RX ADMIN — CEFAZOLIN SODIUM 2 G: 2 INJECTION, SOLUTION INTRAVENOUS at 09:42

## 2024-01-18 RX ADMIN — PROPOFOL 200 MG: 10 INJECTION, EMULSION INTRAVENOUS at 09:46

## 2024-01-18 RX ADMIN — DROPERIDOL 0.62 MG: 2.5 INJECTION, SOLUTION INTRAMUSCULAR; INTRAVENOUS at 10:49

## 2024-01-18 RX ADMIN — KETOROLAC TROMETHAMINE 30 MG: 30 INJECTION, SOLUTION INTRAMUSCULAR; INTRAVENOUS at 10:16

## 2024-01-18 RX ADMIN — FENTANYL CITRATE 50 MCG: 50 INJECTION, SOLUTION INTRAMUSCULAR; INTRAVENOUS at 09:43

## 2024-01-18 RX ADMIN — ONDANSETRON 4 MG: 2 INJECTION INTRAMUSCULAR; INTRAVENOUS at 10:40

## 2024-01-18 RX ADMIN — SODIUM CHLORIDE, POTASSIUM CHLORIDE, SODIUM LACTATE AND CALCIUM CHLORIDE 9 ML/HR: 600; 310; 30; 20 INJECTION, SOLUTION INTRAVENOUS at 08:30

## 2024-01-18 RX ADMIN — ACETAMINOPHEN 1000 MG: 500 TABLET ORAL at 08:29

## 2024-01-18 RX ADMIN — MIDAZOLAM HYDROCHLORIDE 2 MG: 1 INJECTION, SOLUTION INTRAMUSCULAR; INTRAVENOUS at 09:35

## 2024-01-18 RX ADMIN — FENTANYL CITRATE 50 MCG: 50 INJECTION, SOLUTION INTRAMUSCULAR; INTRAVENOUS at 09:50

## 2024-01-18 RX ADMIN — LIDOCAINE HYDROCHLORIDE 60 MG: 20 INJECTION, SOLUTION EPIDURAL; INFILTRATION; INTRACAUDAL; PERINEURAL at 09:43

## 2024-01-18 RX ADMIN — DEXAMETHASONE SODIUM PHOSPHATE 8 MG: 4 INJECTION, SOLUTION INTRAMUSCULAR; INTRAVENOUS at 09:49

## 2024-01-18 RX ADMIN — ONDANSETRON 4 MG: 2 INJECTION INTRAMUSCULAR; INTRAVENOUS at 10:01

## 2024-01-18 NOTE — ANESTHESIA PREPROCEDURE EVALUATION
Anesthesia Evaluation     Patient summary reviewed and Nursing notes reviewed   no history of anesthetic complications:   NPO Solid Status: > 8 hours  NPO Liquid Status: > 2 hours           Airway   Mallampati: II  TM distance: >3 FB  Neck ROM: full  No difficulty expected  Dental          Pulmonary - normal exam    breath sounds clear to auscultation  (+) a smoker Former,  Cardiovascular - normal exam  Exercise tolerance: good (4-7 METS)    Rhythm: regular  Rate: normal        Neuro/Psych  (+) psychiatric history Depression  GI/Hepatic/Renal/Endo    (+) obesity    Musculoskeletal     Abdominal    Substance History      OB/GYN          Other        ROS/Med Hx Other: PAT Nursing Notes unavailable.                Anesthesia Plan    ASA 2     general     (Patient understands anesthesia not responsible for dental damage.)  intravenous induction     Anesthetic plan, risks, benefits, and alternatives have been provided, discussed and informed consent has been obtained with: patient.    Plan discussed with CRNA.    CODE STATUS:

## 2024-01-18 NOTE — DISCHARGE INSTRUCTIONS
DISCHARGE INSTRUCTIONS  SURGICAL / AMBULATORY  PROCEDURES      For your surgery you had:  General anesthesia (you may have a sore throat for the first 24 hours)    Local anesthesia    You received a medicated patch for nausea prevention today (behind your ear). It is recommended that you remove it 24-48 hours post-operatively. It must be removed within 72 hours.     You may experience dizziness, drowsiness, or light-headedness for several hours following surgery/procedure.  Do not stay alone today or tonight.  Limit your activity for 24 hours.  Resume your diet slowly.  Follow whatever special dietary instructions you may have been given by your doctor.  You should not drive or operate machinery, drink alcohol, or sign legally binding documents for 24 hours or while you are taking pain medication.  Last dose of pain medication was given at:   .  NOTIFY YOUR DOCTOR IF YOU EXPERIENCE ANY OF THE FOLLOWING:  Temperature greater than 101 degrees Fahrenheit  Shaking Chills  Redness or excessive drainage from incision  Nausea, vomiting and/or pain that is not controlled by prescribed medications  Increase in bleeding or bleeding that is excessive  Unable to urinate in 6 hours after surgery  If unable to reach your doctor, please go to the closest Emergency Room    Apply an ice pack 24-48 hours.  Medications per physician instructions as indicated on Discharge Medication Information Sheet.  You should see   for follow-up care   on   .  Phone number:       SPECIAL INSTRUCTIONS:     Dr. Frey office is setting up dressing changes, will call with instructions

## 2024-01-18 NOTE — OP NOTE
AXILLARY NODE DISSECTION  Procedure Report    Patient Name:  Chetna Woody  YOB: 1995    Date of Surgery:  1/18/2024     Indications: The patient is a 28-year-old female that presented with chronic draining hidradenitis suppurativa of the right axilla.  She had multiple draining sinuses and the decision was made to proceed with a right axillary skin excision.    Pre-op Diagnosis: Right axillary hidradenitis    Post-Op Diagnosis: Same    Procedure/CPT® Codes:    Excision of right axillary hidradenitis    Staff:  Surgeon(s):  Mart Frederick MD         Anesthesia: General    Estimated Blood Loss: 2 mL    Implants:    Nothing was implanted during the procedure    Specimen:          Specimens       ID Source Type Tests Collected By Collected At Frozen?    A Axilla, Right Tissue TISSUE PATHOLOGY EXAM   Mart Frederick MD 1/18/24 1011 No    Description: right axillary hydradenitis                Findings: 6 x 8 cm skin and subcutaneous fat excision    Complications: None    Description of Procedure: The patient was taken the operating room and placed on the table in supine position.  After induction of general anesthesia her right axilla was prepped and draped sterilely.  She had multiple draining sinuses with some obviously draining pus.  We made an elliptical incision around the area with the obvious hidradenitis changes in this area was about 6 x 8 cm using a 15 blade scalpel.  I dissected down into the subcutaneous tissues and excised the skin ellipse was subcutaneous fat using cautery.  When this was removed we appeared to have removed all of the obvious sinuses and I saw no other subcutaneous tunneling going off in any direction.  Adequate hemostasis was achieved with cautery.  We irrigated out the wound with sterile saline.  The wound was then packed open with a saline moistened gauze and sterile dressings were applied.  She tolerated this well and was taken the postanesthesia recovery room  in stable condition.       was responsible for performing the following activities: Retraction, Suction, Irrigation, and Placing Dressing and their skilled assistance was necessary for the success of this case.    Mart Frederick MD     Date: 1/18/2024  Time: 10:20 EST

## 2024-01-18 NOTE — ANESTHESIA POSTPROCEDURE EVALUATION
Patient: Chetna Woody    Procedure Summary       Date: 01/18/24 Room / Location: [facility] OR  / [facility]    Anesthesia Start: 0942 Anesthesia Stop: 1033    Procedure: AXILLARY HIDRADENITIS EXCISION (Right: Axilla) Diagnosis:       Hidradenitis suppurativa      (Hidradenitis suppurativa [L73.2])    Surgeons: Mart Frederick MD Provider: Piyush Severino MD    Anesthesia Type: general ASA Status: 2            Anesthesia Type: general    Vitals  Vitals Value Taken Time   /80 01/18/24 1100   Temp 36.5 °C (97.7 °F) 01/18/24 1030   Pulse 83 01/18/24 1100   Resp 19 01/18/24 1030   SpO2 98 % 01/18/24 1100           Post Anesthesia Care and Evaluation    Patient location during evaluation: bedside  Patient participation: complete - patient participated  Level of consciousness: awake  Pain management: adequate    Airway patency: patent  PONV Status: none  Cardiovascular status: acceptable and stable  Respiratory status: acceptable  Hydration status: acceptable    Comments: An Anesthesiologist personally participated in the most demanding procedures (including induction and emergence if applicable) in the anesthesia plan, monitored the course of anesthesia administration at frequent intervals and remained physically present and available for immediate diagnosis and treatment of emergencies.

## 2024-01-19 ENCOUNTER — HOSPITAL ENCOUNTER (OUTPATIENT)
Dept: INFUSION THERAPY | Facility: HOSPITAL | Age: 29
Discharge: HOME | End: 2024-01-19
Admitting: NURSE PRACTITIONER
Payer: COMMERCIAL

## 2024-01-19 VITALS
HEART RATE: 78 BPM | OXYGEN SATURATION: 100 % | SYSTOLIC BLOOD PRESSURE: 122 MMHG | RESPIRATION RATE: 16 BRPM | DIASTOLIC BLOOD PRESSURE: 56 MMHG | TEMPERATURE: 98.5 F

## 2024-01-19 DIAGNOSIS — L73.2 HIDRADENITIS SUPPURATIVA: Primary | ICD-10-CM

## 2024-01-19 LAB
CYTO UR: NORMAL
LAB AP CASE REPORT: NORMAL
LAB AP CLINICAL INFORMATION: NORMAL
PATH REPORT.FINAL DX SPEC: NORMAL
PATH REPORT.GROSS SPEC: NORMAL

## 2024-01-19 PROCEDURE — 97602 WOUND(S) CARE NON-SELECTIVE: CPT

## 2024-01-19 PROCEDURE — G0463 HOSPITAL OUTPT CLINIC VISIT: HCPCS

## 2024-01-19 NOTE — NURSING NOTE
Call placed to Dr. Frederick concerning surgical wound. Ok to place wound vac to right axilla if able to get good seal with next recheck on Monday.   Cynthia Raya RN

## 2024-01-19 NOTE — SIGNIFICANT NOTE
Wound Eval / Progress Noted     [facility]     Patient Name: Chetna Woody  : 1995  MRN: [id number]  Today's Date: 2024                 Admit Date: 2024    Visit Dx:  No diagnosis found.      * No active hospital problems. *        Past Medical History:   Diagnosis Date    Amenorrhea     Anemia     ASYMPTOMATIC    Boils     Congenital meatal stenosis     Constipation     GERD (gastroesophageal reflux disease)     Glands swollen     Headache     Heart burn     Hidradenitis     Hidradenitis suppurativa     IC (interstitial cystitis)     Insulin resistance 2018    Psoriasis         Past Surgical History:   Procedure Laterality Date    ABLATION COLPOCLESIS      ADENOIDECTOMY      AXILLARY NODE DISSECTION Right 2024    Procedure: AXILLARY HIDRADENITIS EXCISION;  Surgeon: Mart Frederick MD;  [place] [facility] [place] [facility] [place]  Service: General;  Laterality: Right;    CERVIX SURGERY      cyst and scar tissue remover right cervical neck- LYMPH NODES REMOVED DELFIN     SECTION      baby girl    CYSTOSCOPY      D & C HYSTEROSCOPY ENDOMETRIAL ABLATION      LASER ABLATION  2021    Uterine    SALPINGECTOMY  2021    TONSILLECTOMY      TUBAL ABDOMINAL LIGATION      URETHRAL DILATATION      WISDOM TOOTH EXTRACTION  2017         Physical Assessment:  Wound Right mid axillary Incision (Active)   Wound Image   24 1205   Dressing Appearance intact;moist drainage 24 1205   Closure None 24 1205   Base red;moist;subcutaneous;yellow 24 1205   Red (%), Wound Tissue Color 50 24 1205   Yellow (%), Wound Tissue Color 50 24 1205   Periwound intact;redness;warm 24 1205   Periwound Temperature warm 24 1205   Periwound Skin Turgor soft 24 1205   Edges open 24 1205   Wound Length (cm) 11 cm 24 1205   Wound Width (cm) 5.3 cm 24 1205   Wound Depth (cm) 3.5 cm 24 1205   Wound Surface Area (cm^2) 58.3 cm^2 24 1205    Wound Volume (cm^3) 204.05 cm^3 01/19/24 1205   Drainage Characteristics/Odor serosanguineous 01/19/24 1205   Drainage Amount small;moderate 01/19/24 1205   Care, Wound cleansed with;irrigated with;sterile normal saline 01/19/24 1205   Dressing Care dressing applied;dressing moistened;gauze, [id number];abdominal pad 01/19/24 1205   Periwound Care absorptive dressing applied 01/19/24 1205        Wound Check / Follow-up:  Patient seen today for wound evaluation / dressing change. Patient was seen today after having a surgical incision to right axilla yesterday 01/18/24 due to HS. She states she has had HS since she was 7 and has numerous debridements but never an extensive surgical incision.     She presents today with intact surgical dressing that has moderate amount of drainage but is not saturated. After removal, Irrigated wound with NS and gauze. Small amount of drainage noted during this time. Wound base is red and moist with yellow subcutaneous tissue noted. Wound filled with NS moistened gauze and then covered with ABD pad. Patient to have daily dressing changes and then can re-evaluate on Monday for possible wound vac placement if Surgeon agreeable.     Patient's mother to perform dressing changes over the weekend. Supplies for the dressing changes provided at this time.     Impression: Surgical wound to right axilla with closure by secondary intent    Short term goals:  Regain skin integrity. Daily dressing changes with possible negative pressure wound therapy in the future.     Cynthia Raya RN    1/19/2024    12:38 EST

## 2024-01-22 ENCOUNTER — HOSPITAL ENCOUNTER (OUTPATIENT)
Dept: INFUSION THERAPY | Facility: HOSPITAL | Age: 29
Discharge: HOME | End: 2024-01-22
Admitting: NURSE PRACTITIONER
Payer: COMMERCIAL

## 2024-01-22 ENCOUNTER — TELEPHONE (OUTPATIENT)
Dept: SURGERY | Facility: CLINIC | Age: 29
End: 2024-01-22
Payer: COMMERCIAL

## 2024-01-22 VITALS
DIASTOLIC BLOOD PRESSURE: 69 MMHG | TEMPERATURE: 98.4 F | OXYGEN SATURATION: 100 % | RESPIRATION RATE: 16 BRPM | HEART RATE: 104 BPM | SYSTOLIC BLOOD PRESSURE: 132 MMHG

## 2024-01-22 DIAGNOSIS — L73.2 HIDRADENITIS SUPPURATIVA: Primary | ICD-10-CM

## 2024-01-22 PROCEDURE — G0463 HOSPITAL OUTPT CLINIC VISIT: HCPCS

## 2024-01-22 PROCEDURE — 97602 WOUND(S) CARE NON-SELECTIVE: CPT

## 2024-01-22 RX ORDER — ONDANSETRON 4 MG/1
4 TABLET, FILM COATED ORAL DAILY PRN
Qty: 30 TABLET | Refills: 1 | Status: SHIPPED | OUTPATIENT
Start: 2024-01-22 | End: 2025-01-21

## 2024-01-22 RX ORDER — OXYCODONE AND ACETAMINOPHEN 7.5; 325 MG/1; MG/1
1 TABLET ORAL EVERY 6 HOURS PRN
Qty: 20 TABLET | Refills: 0 | Status: SHIPPED | OUTPATIENT
Start: 2024-01-22 | End: 2025-01-21

## 2024-01-22 NOTE — TELEPHONE ENCOUNTER
Pateint had surgery on 1-18. She said that the Hydrocodone is making her nauseous & she is wanting something stronger. Juice Braden in [place].  # [phone]

## 2024-01-22 NOTE — SIGNIFICANT NOTE
Wound Eval / Progress Noted     [facility]     Patient Name: Chetna Woody  : 1995  MRN: [id number]  Today's Date: 2024                 Admit Date: 2024    Visit Dx:  No diagnosis found.      * No active hospital problems. *        Past Medical History:   Diagnosis Date    Amenorrhea     Anemia     ASYMPTOMATIC    Boils     Congenital meatal stenosis     Constipation     GERD (gastroesophageal reflux disease)     Glands swollen     Headache     Heart burn     Hidradenitis     Hidradenitis suppurativa     IC (interstitial cystitis)     Insulin resistance 2018    Psoriasis         Past Surgical History:   Procedure Laterality Date    ABLATION COLPOCLESIS      ADENOIDECTOMY      AXILLARY NODE DISSECTION Right 2024    Procedure: AXILLARY HIDRADENITIS EXCISION;  Surgeon: Mart Frederick MD;  [place] [facility] [place] [facility] [place]: General;  Laterality: Right;    CERVIX SURGERY      cyst and scar tissue remover right cervical neck- LYMPH NODES REMOVED DELFIN     SECTION      baby girl    CYSTOSCOPY      D & C HYSTEROSCOPY ENDOMETRIAL ABLATION      LASER ABLATION  2021    Uterine    SALPINGECTOMY  2021    TONSILLECTOMY      TUBAL ABDOMINAL LIGATION      URETHRAL DILATATION      WISDOM TOOTH EXTRACTION  2017         Physical Assessment:  Wound Right mid axillary Incision (Active)   Wound Image   24 1137   Dressing Appearance moist drainage;intact 24 1137   Closure None 24 1137   Base red;moist;subcutaneous;yellow 24 1137   Periwound dry;intact 24 1137   Periwound Temperature warm 24 1137   Periwound Skin Turgor soft 24 1137   Edges open 24 1137   Wound Length (cm) 9.7 cm 24 1137   Wound Width (cm) 6.3 cm 24 1137   Wound Depth (cm) 3.9 cm 24 1137   Wound Surface Area (cm^2) 61.11 cm^2 24 1137   Wound Volume (cm^3) 238.329 cm^3 24 1137   Drainage Characteristics/Odor serosanguineous 24  1137   Drainage Amount moderate 01/22/24 1137   Care, Wound cleansed with;irrigated with;sterile normal saline 01/22/24 1137   Dressing Care dressing applied;packed with;dressing moistened;sodium chloride impregnated;gauze, wet-to-moist;abdominal pad 01/22/24 1137   Periwound Care absorptive dressing applied 01/22/24 1137      Wound Check / Follow-up: Patient seen today for wound follow-up and dressing change. Patient's mother is present at bedside. Patient remains with surgical wound to right axilla. Dressing is intact with moderate amount of drainage noted to gauze packing in place. Moistened packing with normal saline with normal saline to aid in ease of removal. Wound base is red and moist with subcutaneous tissue noted. Filled wound base with normal saline moistened fluffed gauze roll and secured with ABD pad and paper tape. Patient to return tomorrow morning for home wound VAC placement.     Patient very tearful during dressing change today. Emotional support provided. Patient reports that she does not feel her pain medication is working sufficiently for pain management. Encouraged her to reach out to surgeon's office to see if they can assist with this issue and possibly change her medication. Patient verbalized understanding.        Impression: Surgical wound to right axilla with closure by secondary intent.      Short term goals: Regain skin integrity, skin protection, daily dressing changes until wound VAC placement.       Jojo Buckner RN    1/22/2024    15:45 EST

## 2024-01-23 ENCOUNTER — HOSPITAL ENCOUNTER (OUTPATIENT)
Dept: INFUSION THERAPY | Facility: HOSPITAL | Age: 29
Discharge: HOME | End: 2024-01-23
Admitting: NURSE PRACTITIONER
Payer: COMMERCIAL

## 2024-01-23 ENCOUNTER — TELEPHONE (OUTPATIENT)
Dept: SURGERY | Facility: CLINIC | Age: 29
End: 2024-01-23
Payer: COMMERCIAL

## 2024-01-23 VITALS
TEMPERATURE: 98.3 F | HEART RATE: 100 BPM | OXYGEN SATURATION: 100 % | RESPIRATION RATE: 16 BRPM | DIASTOLIC BLOOD PRESSURE: 66 MMHG | SYSTOLIC BLOOD PRESSURE: 123 MMHG

## 2024-01-23 DIAGNOSIS — Z48.89 ENCOUNTER FOR POSTOPERATIVE WOUND CHECK: Primary | ICD-10-CM

## 2024-01-23 PROCEDURE — G0463 HOSPITAL OUTPT CLINIC VISIT: HCPCS

## 2024-01-23 PROCEDURE — 97606 NEG PRS WND THER DME>50 SQCM: CPT

## 2024-01-23 PROCEDURE — 97605 NEG PRS WND THER DME<=50SQCM: CPT

## 2024-01-23 NOTE — SIGNIFICANT NOTE
Wound Eval / Progress Noted     [facility]     Patient Name: Chetna Woody  : 1995  MRN: [id number]  Today's Date: 2024                 Admit Date: 2024    Visit Dx:  No diagnosis found.      * No active hospital problems. *        Past Medical History:   Diagnosis Date    Amenorrhea     Anemia     ASYMPTOMATIC    Boils     Congenital meatal stenosis     Constipation     GERD (gastroesophageal reflux disease)     Glands swollen     Headache     Heart burn     Hidradenitis     Hidradenitis suppurativa     IC (interstitial cystitis)     Insulin resistance 2018    Psoriasis         Past Surgical History:   Procedure Laterality Date    ABLATION COLPOCLESIS      ADENOIDECTOMY      AXILLARY NODE DISSECTION Right 2024    Procedure: AXILLARY HIDRADENITIS EXCISION;  Surgeon: Mart Frederick MD;  [place] [facility] [place] [facility] [place]: [place];  Laterality: Right;    CERVIX SURGERY      cyst and scar tissue remover right cervical neck- LYMPH NODES REMOVED DELFIN     SECTION      baby girl    CYSTOSCOPY      D & C HYSTEROSCOPY ENDOMETRIAL ABLATION      LASER ABLATION  2021    Uterine    SALPINGECTOMY  2021    TONSILLECTOMY      TUBAL ABDOMINAL LIGATION      URETHRAL DILATATION      WISDOM TOOTH EXTRACTION  2017         Physical Assessment:  Wound Right mid axillary Incision (Active)   Dressing Appearance moist drainage;intact 24 1018   Closure None 24 1018   Base moist;red;bleeding;subcutaneous 24 1018   Periwound dry;intact 24 1018   Periwound Temperature warm 24 1018   Periwound Skin Turgor soft 24 1018   Edges open 24 1018   Drainage Characteristics/Odor bleeding controlled;serosanguineous 24 1018   Drainage Amount small 24 1018   Care, Wound cleansed with;irrigated with;sterile normal saline;negative pressure wound therapy 24 1018   Dressing Care dressing applied;foam;transparent film 24 1018    Periwound Care absorptive dressing applied;barrier film applied 01/23/24 1018       NPWT (Negative Pressure Wound Therapy) 01/23/24 1018 right axilla (Active)   Therapy Setting continuous therapy 01/23/24 1018   Dressing foam, black;transparent dressing 01/23/24 1018   Pressure Setting 125 mmHg 01/23/24 1018   Sponges Inserted 2 01/23/24 1018   Finger sweep complete Yes 01/23/24 1018      Wound Check / Follow-up: Patient seen today for wound follow-up and wound VAC placement. Patient's mother is present during visit. Patient reports that she called surgeon's office yesterday regarding pain medication concerns and her pain medication was changed. She states that she will be picking it up from the pharmacy today.    Surgical wound remains to right axilla. Dressing in place with drainage noted to packing in place. Moistened packing with normal saline to aid in ease of removal. Wound base is red and moist with subcutaneous tissue noted. Small amount of bleeding present today. Cleansed and irrigated wound with normal saline. Applied skin barrier wipe to periwound tissue. Filled wound with one piece of black foam and secured with transparent drape. Additional piece of black foam added as bridge to right shoulder then secured with transparent drape. VAC attached, foam compressed, no signs of lifting or leaking. VAC is functioning properly without alarms. Patient tolerated dressing change fair. She remained very tearful during dressing change and was coached on deep breathing. Emotional support was provided.    Education provided to patient and patient's mother regarding use of home wound VAC, troubleshooting leaks / alarms and how to repair, dressing changes if suction / seal lost for more than two hours, resources / phone numbers to call for supply ordering / general questions / technical support, and how to return unit once therapy is complete. Patient and patient's mother verbalized understanding to all education  provided. All questions were answered. Patient to return Friday for next dressing change. Patient instructed how to make follow up appointments and to make them for MWF for at least 2 weeks. She verbalized understanding. Card given with scheduling phone number.      Impression: Surgical wound to right axilla with closure by secondary intent.       Short term goals: Regain skin integrity, skin protection, negative pressure wound therapy, 3x weekly dressing changes on MWF.      Jojo Buckner RN    1/23/2024    12:56 EST

## 2024-01-23 NOTE — TELEPHONE ENCOUNTER
PT HAD WOUND VAC PLACED TODAY, SHE SAID THAT THE PAIN DURING THE PROCESS WAS UNBEARABLE. SHE STATES THAT THE PERCOCET DIDN'T TOUCH THE PAIN WHILE THEY WERE PLACING IT. SHE WANTS TO KNOW IF THERE IS AN INJECTION THAT CAN BE GIVEN WHILE SHE IS THERE. IT IS DUE TO BE CHANGED AGAIN ON FRIDAY. I TOLD HER THIS WAS NOT STANDARD PRACTICE BUT SHE WANTED ME TO ASK.

## 2024-01-25 ENCOUNTER — TELEPHONE (OUTPATIENT)
Dept: SURGERY | Facility: CLINIC | Age: 29
End: 2024-01-25
Payer: COMMERCIAL

## 2024-01-25 NOTE — TELEPHONE ENCOUNTER
AFTER HOURS     Hub staff attempted to follow warm transfer process and was unsuccessful     Caller: LEO DING    Relationship to patient: SELF    Best call back number: [phone] (home)       Patient is needing: RETURNING MISSED CALL [phone]- PT NOT SURE WHO CONTACTED HER.

## 2024-01-26 ENCOUNTER — HOSPITAL ENCOUNTER (OUTPATIENT)
Dept: INFUSION THERAPY | Facility: HOSPITAL | Age: 29
Discharge: HOME | End: 2024-01-26
Payer: COMMERCIAL

## 2024-01-26 VITALS
HEART RATE: 101 BPM | RESPIRATION RATE: 20 BRPM | TEMPERATURE: 98.7 F | OXYGEN SATURATION: 100 % | DIASTOLIC BLOOD PRESSURE: 75 MMHG | SYSTOLIC BLOOD PRESSURE: 124 MMHG

## 2024-01-26 DIAGNOSIS — L73.2 HIDRADENITIS SUPPURATIVA: Primary | ICD-10-CM

## 2024-01-26 PROCEDURE — 97605 NEG PRS WND THER DME<=50SQCM: CPT

## 2024-01-26 PROCEDURE — G0463 HOSPITAL OUTPT CLINIC VISIT: HCPCS

## 2024-01-26 NOTE — TELEPHONE ENCOUNTER
I have called and spoke with this patient. I did inform her that numbing shot is not an option. I instructed her to take a pain pill 30 minutes prior to the wound vac exchange and have someone drive her. Patient verbalized understanding.

## 2024-01-26 NOTE — SIGNIFICANT NOTE
Wound Eval / Progress Noted     [facility]     Patient Name: Chetna Woody  : 1995  MRN: [id number]  Today's Date: 2024                 Admit Date: 2024    Visit Dx:    ICD-10-CM ICD-9-CM   1. Hidradenitis suppurativa  L73.2 705.83         * No active hospital problems. *        Past Medical History:   Diagnosis Date    Amenorrhea     Anemia     ASYMPTOMATIC    Boils     Congenital meatal stenosis     Constipation     GERD (gastroesophageal reflux disease)     Glands swollen     Headache     Heart burn     Hidradenitis     Hidradenitis suppurativa     IC (interstitial cystitis)     Insulin resistance 2018    Psoriasis         Past Surgical History:   Procedure Laterality Date    ABLATION COLPOCLESIS      ADENOIDECTOMY      AXILLARY NODE DISSECTION Right 2024    Procedure: AXILLARY HIDRADENITIS EXCISION;  Surgeon: Mart Frederick MD;  [place] [facility] [place] [facility] [place]  Service: General;  Laterality: Right;    CERVIX SURGERY      cyst and scar tissue remover right cervical neck- LYMPH NODES REMOVED DELFIN     SECTION      baby girl    CYSTOSCOPY      D & C HYSTEROSCOPY ENDOMETRIAL ABLATION      LASER ABLATION  2021    Uterine    SALPINGECTOMY  2021    TONSILLECTOMY      TUBAL ABDOMINAL LIGATION      URETHRAL DILATATION      WISDOM TOOTH EXTRACTION  2017         Physical Assessment:  Wound Right mid axillary Incision (Active)   Wound Image   24 1046   Dressing Appearance dry;intact 24 1046   Closure None 24 1046   Base red;moist;granulating;bleeding 24 1046   Periwound redness;intact 24 1046   Periwound Temperature warm 24 1046   Periwound Skin Turgor soft 24 1046   Edges open 24 1046   Wound Length (cm) 10.3 cm 24 1046   Wound Width (cm) 4.5 cm 24 1046   Wound Depth (cm) 1.6 cm 24 1046   Wound Surface Area (cm^2) 46.35 cm^2 24 1046   Wound Volume (cm^3) 74.16 cm^3 24 1046   Drainage  Characteristics/Odor serosanguineous;bleeding controlled 01/26/24 1046   Drainage Amount small 01/26/24 1046   Care, Wound cleansed with;irrigated with;sterile normal saline;negative pressure wound therapy 01/26/24 1046   Dressing Care dressing applied;foam;transparent film 01/26/24 1046   Periwound Care absorptive dressing applied;barrier film applied 01/26/24 1046       NPWT (Negative Pressure Wound Therapy) 01/23/24 1018 right axilla (Active)   Therapy Setting continuous therapy 01/26/24 1046   Dressing foam, black 01/26/24 1046   Pressure Setting 125 mmHg 01/26/24 1046   Sponges Inserted 3 01/26/24 1046   Sponges Removed 2 01/26/24 1046   Finger sweep complete Yes 01/26/24 1046      Wound Check / Follow-up: Patient seen today for wound follow-up and wound VAC dressing change. Patient presents with intact dressing and wound VAC is functioning without alarms. Patient's mother is present at bedside.    Removed dressing with use of adhesive remover. Wound base to right axilla is red, moist and granulating. Small amount of bleeding noted. Minor irritation from foam noted to periwound tissue on left side. Cleansed and irrigated wound with normal saline. Applied skin barrier to periwound tissue. Filled wound with two pieces of black foam and secured with transparent drape. Bridge created to right shoulder with additional piece of black foam and secured with transparent drape. VAC attached, foam compressed, no signs of lifting or leaking. VAC is functioning properly without alarms. Patient remained tearful during dressing change but overall was better than previously. Patient exhibiting anxiety. Discussed with her the potential need for anti-anxiety medication and to possibly discuss with her PCP. Patient verbalized understanding. Recommending to continue current wound care with negative pressure wound therapy.       Impression: Surgical wound to right axilla with closure by secondary intent.      Short term goals:  Regain skin integrity, skin protection, negative pressure wound therapy, 3x weekly dressing changes on MWF.      Jojo Buckner RN    1/26/2024    12:15 EST

## 2024-01-26 NOTE — ADDENDUM NOTE
Encounter addended by: Jojo Buckner RN on: 1/26/2024 12:37 PM   Actions taken: Flowsheet accepted, Clinical Note Signed, Charge Capture section accepted, Therapy plan modified

## 2024-01-26 NOTE — ADDENDUM NOTE
Encounter addended by: Jojo Buckner RN on: 1/26/2024 12:52 PM   Actions taken: Order list changed, Therapy plan modified

## 2024-01-29 ENCOUNTER — HOSPITAL ENCOUNTER (OUTPATIENT)
Dept: INFUSION THERAPY | Facility: HOSPITAL | Age: 29
Discharge: HOME | End: 2024-01-29
Admitting: NURSE PRACTITIONER
Payer: COMMERCIAL

## 2024-01-29 VITALS
SYSTOLIC BLOOD PRESSURE: 134 MMHG | OXYGEN SATURATION: 100 % | DIASTOLIC BLOOD PRESSURE: 76 MMHG | RESPIRATION RATE: 20 BRPM | HEART RATE: 98 BPM | TEMPERATURE: 98.4 F

## 2024-01-29 DIAGNOSIS — L73.2 HIDRADENITIS SUPPURATIVA: Primary | ICD-10-CM

## 2024-01-29 PROCEDURE — 97605 NEG PRS WND THER DME<=50SQCM: CPT

## 2024-01-29 PROCEDURE — G0463 HOSPITAL OUTPT CLINIC VISIT: HCPCS

## 2024-01-29 NOTE — SIGNIFICANT NOTE
Wound Eval / Progress Noted     [facility]     Patient Name: Chetna Woody  : 1995  MRN: [id number]  Today's Date: 2024                 Admit Date: 2024    Visit Dx:  No diagnosis found.      * No active hospital problems. *        Past Medical History:   Diagnosis Date    Amenorrhea     Anemia     ASYMPTOMATIC    Boils     Congenital meatal stenosis     Constipation     GERD (gastroesophageal reflux disease)     Glands swollen     Headache     Heart burn     Hidradenitis     Hidradenitis suppurativa     IC (interstitial cystitis)     Insulin resistance 2018    Psoriasis         Past Surgical History:   Procedure Laterality Date    ABLATION COLPOCLESIS      ADENOIDECTOMY      AXILLARY NODE DISSECTION Right 2024    Procedure: AXILLARY HIDRADENITIS EXCISION;  Surgeon: Mart Frederick MD;  [place] [facility] [place] [facility] [place]  Service: General;  Laterality: Right;    CERVIX SURGERY      cyst and scar tissue remover right cervical neck- LYMPH NODES REMOVED DELFIN     SECTION      baby girl    CYSTOSCOPY      D & C HYSTEROSCOPY ENDOMETRIAL ABLATION      LASER ABLATION  2021    Uterine    SALPINGECTOMY  2021    TONSILLECTOMY      TUBAL ABDOMINAL LIGATION      URETHRAL DILATATION      WISDOM TOOTH EXTRACTION  2017         Physical Assessment:  Wound Right mid axillary Incision (Active)   Wound Image   24 1000   Dressing Appearance dry;intact 24 1000   Closure None 24 1000   Base red;moist;granulating;bleeding 24 1000   Periwound redness;intact 24 1000   Periwound Temperature warm 24 1000   Periwound Skin Turgor soft 24 1000   Edges open 24 1000   Wound Length (cm) 10.4 cm 24 1000   Wound Width (cm) 4.4 cm 24 1000   Wound Depth (cm) 1.6 cm 24 1000   Wound Surface Area (cm^2) 45.76 cm^2 24 1000   Wound Volume (cm^3) 73.216 cm^3 24 1000   Drainage Characteristics/Odor serosanguineous;bleeding  controlled 01/29/24 1000   Drainage Amount small 01/29/24 1000   Care, Wound cleansed with;irrigated with;sterile normal saline;negative pressure wound therapy 01/29/24 1000   Dressing Care dressing applied;foam;transparent film 01/29/24 1000   Periwound Care absorptive dressing applied;barrier film applied 01/29/24 1000       NPWT (Negative Pressure Wound Therapy) 01/23/24 1018 right axilla (Active)   Therapy Setting continuous therapy 01/29/24 1000   Dressing foam, black 01/29/24 1000   Pressure Setting 125 mmHg 01/29/24 1000   Sponges Inserted 2 01/29/24 1000   Sponges Removed 3 01/29/24 1000   Finger sweep complete Yes 01/29/24 1000      Wound Check / Follow-up: Patient seen today for wound follow-up and wound VAC dressing change. Patient presents with intact dressing and wound VAC is functioning properly without alarms. She reports no issues over the weekend. Patient's mother is present at bedside.    Removed dressing with use of adhesive remover. Wound base to right axilla is red, moist, and granulating. Small amount of bleeding noted. Irritation from foam remains to periwound tissue. Cleansed and irrigated wound with normal saline. Applied skin barrier to periwound tissue then applied strips of transparent drape around wound margins to protect from foam. Filled wound with black foam and secured with transparent drape. Bridge created to right shoulder with additional piece of black foam and secured with transparent drape. VAC attached, foam compressed, no signs of lifting or leaking. VAC is functioning properly without alarms. Patient tolerated dressing change well. Recommending to continue current care with negative pressure wound therapy.       Impression: Surgical wound to right axilla with closure by secondary intent.       Short term goals: Regain skin integrity, skin protection, negative pressure wound therapy, 3x weekly dressing changes on MWF.       Jojo Buckner RN    1/29/2024    14:15 EST

## 2024-01-31 ENCOUNTER — HOSPITAL ENCOUNTER (OUTPATIENT)
Dept: INFUSION THERAPY | Facility: HOSPITAL | Age: 29
Discharge: HOME | End: 2024-01-31
Admitting: NURSE PRACTITIONER
Payer: COMMERCIAL

## 2024-01-31 VITALS
OXYGEN SATURATION: 100 % | HEART RATE: 102 BPM | DIASTOLIC BLOOD PRESSURE: 77 MMHG | TEMPERATURE: 98.4 F | RESPIRATION RATE: 20 BRPM | SYSTOLIC BLOOD PRESSURE: 140 MMHG

## 2024-01-31 DIAGNOSIS — L73.2 HIDRADENITIS SUPPURATIVA: Primary | ICD-10-CM

## 2024-01-31 PROCEDURE — 97605 NEG PRS WND THER DME<=50SQCM: CPT

## 2024-01-31 NOTE — SIGNIFICANT NOTE
Wound Eval / Progress Noted     [facility]     Patient Name: Chetna Woody  : 1995  MRN: [id number]  Today's Date: 2024                 Admit Date: 2024    Visit Dx:    ICD-10-CM ICD-9-CM   1. Hidradenitis suppurativa  L73.2 705.83         * No active hospital problems. *        Past Medical History:   Diagnosis Date    Amenorrhea     Anemia     ASYMPTOMATIC    Boils     Congenital meatal stenosis     Constipation     GERD (gastroesophageal reflux disease)     Glands swollen     Headache     Heart burn     Hidradenitis     Hidradenitis suppurativa     IC (interstitial cystitis)     Insulin resistance 2018    Psoriasis         Past Surgical History:   Procedure Laterality Date    ABLATION COLPOCLESIS      ADENOIDECTOMY      AXILLARY NODE DISSECTION Right 2024    Procedure: AXILLARY HIDRADENITIS EXCISION;  Surgeon: Mart Frederick MD;  [place] [facility] [place] [facility] [place]  Service: General;  Laterality: Right;    CERVIX SURGERY      cyst and scar tissue remover right cervical neck- LYMPH NODES REMOVED DELFIN     SECTION      baby girl    CYSTOSCOPY      D & C HYSTEROSCOPY ENDOMETRIAL ABLATION      LASER ABLATION  2021    Uterine    SALPINGECTOMY  2021    TONSILLECTOMY      TUBAL ABDOMINAL LIGATION      URETHRAL DILATATION      WISDOM TOOTH EXTRACTION  2017         Physical Assessment:  Wound Right mid axillary Incision (Active)   Wound Image   24 1007   Dressing Appearance moist drainage;intact 24 1007   Closure None 24 [facility]   Base moist;red;granulating;bleeding 24 1007   Periwound dry;redness 24 [facility]   Periwound Temperature warm 24   Periwound Skin Turgor soft 24 1007   Edges open 24 [facility]   Wound Length (cm) 10.2 cm 24 [facility]   Wound Width (cm) 4.5 cm 24 1007   Wound Depth (cm) 1.2 cm 24 1007   Wound Surface Area (cm^2) 45.9 cm^2 24 [facility]   Wound Volume (cm^3) 55.08 cm^3 24 1007    Drainage Characteristics/Odor serosanguineous;bleeding controlled 01/31/24 1007   Drainage Amount small 01/31/24 1007   Care, Wound cleansed with;irrigated with;sterile normal saline 01/31/24 1007   Dressing Care dressing applied;foam;transparent film 01/31/24 1007   Periwound Care absorptive dressing applied;barrier film applied 01/31/24 1007       NPWT (Negative Pressure Wound Therapy) 01/23/24 1018 right axilla (Active)   Therapy Setting continuous therapy 01/31/24 1007   Dressing foam, black 01/31/24 1007   Pressure Setting 125 mmHg 01/31/24 1007   Sponges Inserted 3 01/31/24 1007   Sponges Removed 2 01/31/24 1007   Finger sweep complete Yes 01/31/24 1007      Wound Check / Follow-up: Patient seen today for wound follow-up and wound VAC dressing change. Patient's spouse is present at bedside during visit. Outpatient nursing services RN removed wound VAC dressing prior to my arrival.     Wound base to right axilla remains with red, moist, granulating tissue. Small amount of bleeding noted. Irritation to periwound tissue remains but has improved. Cleansed and irrigated wound with normal saline. Depth is filling in. Applied skin barrier to periwound tissue then applied strips of transparent drape around margins to protect skin from foam. Filled wound with black foam then secured with transparent drape. Bridge created to right shoulder with additional piece of black foam and third piece of black foam added as landing pad then all was secured with transparent dressing. VAC attached, foam compressed, no signs of lifting or leaking. VAC functioning properly without alarms. Patient tolerated well.      Impression: Surgical wound to right axilla with closure by secondary intent.        Short term goals: Regain skin integrity, skin protection, negative pressure wound therapy, 3x weekly dressing changes on MWF.        Jojo Buckner RN    1/31/2024    11:04 EST

## 2024-02-02 ENCOUNTER — OFFICE VISIT (OUTPATIENT)
Dept: SURGERY | Facility: CLINIC | Age: 29
End: 2024-02-02
Payer: COMMERCIAL

## 2024-02-02 ENCOUNTER — HOSPITAL ENCOUNTER (OUTPATIENT)
Dept: INFUSION THERAPY | Facility: HOSPITAL | Age: 29
Discharge: HOME | End: 2024-02-02
Payer: COMMERCIAL

## 2024-02-02 VITALS
HEART RATE: 86 BPM | SYSTOLIC BLOOD PRESSURE: 128 MMHG | RESPIRATION RATE: 16 BRPM | DIASTOLIC BLOOD PRESSURE: 64 MMHG | TEMPERATURE: 98.7 F | OXYGEN SATURATION: 100 %

## 2024-02-02 VITALS — WEIGHT: 216 LBS | HEIGHT: 65 IN | BODY MASS INDEX: 35.99 KG/M2 | RESPIRATION RATE: 16 BRPM

## 2024-02-02 DIAGNOSIS — L73.2 HIDRADENITIS SUPPURATIVA: Primary | ICD-10-CM

## 2024-02-02 PROCEDURE — 99024 POSTOP FOLLOW-UP VISIT: CPT | Performed by: SURGERY

## 2024-02-02 PROCEDURE — G0463 HOSPITAL OUTPT CLINIC VISIT: HCPCS

## 2024-02-02 PROCEDURE — 97605 NEG PRS WND THER DME<=50SQCM: CPT

## 2024-02-02 NOTE — PROGRESS NOTES
Chief Complaint  AXILLARY HIDRADENITIS    Subjective          Chetna Woody presents to [facility] GENERAL SURGERY  History of Present Illness    Chetna Woody is a 28 y.o. female  who presents today for a postoperative visit.     Patient is here for a follow-up after a surgery for an excision of significant right axillary hidradenitis.  She is doing pretty well now.  Initially she had a lot of discomfort but that is getting better.  Her only problem is the fact that she is low on canisters because she was having to change the canisters more frequently because of quite a bit of drainage.  That seems to be getting better as well.    Past History:  Medical History: has a past medical history of Amenorrhea, Anemia, Boils, Congenital meatal stenosis, Constipation, GERD (gastroesophageal reflux disease), Glands swollen, Headache, Heart burn, Hidradenitis, Hidradenitis, Hidradenitis suppurativa, IC (interstitial cystitis), Insulin resistance (2018), and Psoriasis.   Surgical History: has a past surgical history that includes Adenoidectomy; Cervix surgery ();  section (); Cystoscopy; Salpingectomy (2021); Tonsillectomy (); Urethral Dilatation; Laser ablation (2021); [place] tooth extraction (2017); Tubal ligation; d & c hysteroscopy endometrial ablation; Ablation colpoclesis; and Axillary node dissection (Right, 2024).   Family History: family history includes Anxiety disorder in her mother and sister; Diabetes type II in her paternal grandfather and paternal grandmother; Heart disease in her mother; Hyperlipidemia in her paternal grandfather; Hypertension in her paternal grandfather; Hypothyroidism in her maternal grandmother, mother, sister, and another family member; Other in her father.   Social History: reports that she has quit smoking. Her smoking use included cigarettes. She started smoking about 14 years ago. She has a 10.00 pack-year smoking  "history. She has never used smokeless tobacco. She reports current alcohol use. She reports that she does not use drugs.  Allergies: Erythromycin and Sulfamethoxazole-trimethoprim       Current Outpatient Medications:     Cholecalciferol 25 MCG (1000 UT) tablet, Take 1 tablet by mouth Daily., Disp: , Rfl:     Humira Pen 80 MG/0.8ML injection, Inject 0.8 mL under the skin into the appropriate area as directed Every 14 (Fourteen) Days. LAST DOSE WAS 1-17-24, Disp: , Rfl:     HYDROcodone-acetaminophen (NORCO) 5-325 MG per tablet, Take 1 tablet by mouth Every 6 (Six) Hours As Needed for Moderate Pain (Pain)., Disp: 30 tablet, Rfl: 0    ondansetron (Zofran) 4 MG tablet, Take 1 tablet by mouth Daily As Needed for Nausea or Vomiting., Disp: 30 tablet, Rfl: 1    oxyCODONE-acetaminophen (Percocet) 7.5-325 MG per tablet, Take 1 tablet by mouth Every 6 (Six) Hours As Needed for Moderate Pain., Disp: 20 tablet, Rfl: 0    vitamin B-12 (CYANOCOBALAMIN) 100 MCG tablet, Take 0.5 tablets by mouth Daily., Disp: , Rfl:        Physical Exam  We had a picture from her last dressing change a couple days ago and that looks really good.  It is granulating well and did not have any significant exudate.  Objective     Vital Signs:   Resp 16   Ht 165.1 cm (65\")   Wt 98 kg (216 lb)   BMI 35.94 kg/m²              Assessment and Plan    Diagnoses and all orders for this visit:    1. Hidradenitis suppurativa (Primary)    All in all she is doing well.  I told Paul that we will try to help her get some additional canisters since she had to change them more frequently due to her drainage.  I will see her back in 2 weeks for further follow-up.      "

## 2024-02-02 NOTE — SIGNIFICANT NOTE
Wound Eval / Progress Noted     [facility]     Patient Name: Chetna Woody  : 1995  MRN: [id number]  Today's Date: 2024                 Admit Date: 2024    Visit Dx:    ICD-10-CM ICD-9-CM   1. Hidradenitis suppurativa  L73.2 705.83         * No active hospital problems. *        Past Medical History:   Diagnosis Date    Amenorrhea     Anemia     ASYMPTOMATIC    Boils     Congenital meatal stenosis     Constipation     GERD (gastroesophageal reflux disease)     Glands swollen     Headache     Heart burn     Hidradenitis     Hidradenitis     Hidradenitis suppurativa     IC (interstitial cystitis)     Insulin resistance 2018    Psoriasis         Past Surgical History:   Procedure Laterality Date    ABLATION COLPOCLESIS      ADENOIDECTOMY      AXILLARY NODE DISSECTION Right 2024    Procedure: AXILLARY HIDRADENITIS EXCISION;  Surgeon: Mart Frederick MD;  [place] [facility] [place] [facility] [place]  Service: General;  Laterality: Right;    CERVIX SURGERY      cyst and scar tissue remover right cervical neck- LYMPH NODES REMOVED DELFIN     SECTION      baby girl    CYSTOSCOPY      D & C HYSTEROSCOPY ENDOMETRIAL ABLATION      LASER ABLATION  2021    Uterine    SALPINGECTOMY  2021    TONSILLECTOMY      TUBAL ABDOMINAL LIGATION      URETHRAL DILATATION      WISDOM TOOTH EXTRACTION  2017         Physical Assessment:  Wound Right mid axillary Incision (Active)   Wound Image   24 1100   Dressing Appearance moist drainage;intact 24 1100   Closure None 24 1100   Base moist;red;granulating;bleeding 24 1100   Periwound dry;intact 24 1100   Periwound Temperature warm 24 1100   Periwound Skin Turgor soft 24 1100   Edges open 24 1100   Wound Length (cm) 10.1 cm 24 1100   Wound Width (cm) 4.4 cm 24 1100   Wound Depth (cm) 1.1 cm 24 1100   Wound Surface Area (cm^2) 44.44 cm^2 24 1100   Wound Volume (cm^3) 48.884 cm^3  02/02/24 1100   Drainage Characteristics/Odor serosanguineous;bleeding controlled 02/02/24 1100   Drainage Amount small 02/02/24 1100   Care, Wound cleansed with;irrigated with;sterile normal saline 02/02/24 1100   Dressing Care dressing applied;foam;transparent film 02/02/24 1100   Periwound Care absorptive dressing applied;barrier film applied 02/02/24 1100       NPWT (Negative Pressure Wound Therapy) 01/23/24 1018 right axilla (Active)   Therapy Setting continuous therapy 02/02/24 1100   Dressing foam, black;transparent dressing 02/02/24 1100   Pressure Setting 125 mmHg 02/02/24 1100   Sponges Inserted 2 02/02/24 1100   Sponges Removed 3 02/02/24 1100   Finger sweep complete Yes 02/02/24 1100      Wound Check / Follow-up: Patient seen today for wound follow-up and wound VAC dressing change. Patient's spouse is present at bedside. VAC dressing is intact and VAC is functioning properly without alarms.     Removed dressing with use of adhesive  remover. Wound base to right axilla is red, moist, and granulating. Small amount of bleeding noted. Some irritation remains to periwound tissue near wound margins. Wound margins kiel and depth filling in. Cleansed and irrigated wound with normal saline. Applied skin barrier to periwound tissue then applied strips of transparent drape around wound margins to protect skin from foam. Filled wound with black foam then secured with transparent drape. Bridge created to right shoulder with additional piece of black foam and secured with transparent drape. VAC attached, foam compressed, no signs of lifting or leaking. VAC functioning properly without alarms. Patient tolerated well.       Impression: Surgical wound to right axilla with closure by secondary intent.         Short term goals: Regain skin integrity, skin protection, negative pressure wound therapy, 3x weekly dressing changes on MWF.         Jojo Buckner RN    2/2/2024    14:59 EST

## 2024-02-02 NOTE — LETTER
2024     PHYLICIA Pat  [address] [place] [address]    Patient: Chetna Woody   YOB: 1995   Date of Visit: 2024     Dear PHYLICIA Pat:       Thank you for referring Chetna Woody to me for evaluation. Below are the relevant portions of my assessment and plan of care.    If you have questions, please do not hesitate to call me. I look forward to following Chetna along with you.         Sincerely,        Mart Frederick MD        CC: No Recipients    Mart Frederick MD  24 0939  Sign when Signing Visit  Chief Complaint  AXILLARY HIDRADENITIS    Subjective         Chetna Woody presents to [facility] GENERAL SURGERY  History of Present Illness    Chetna Woody is a 28 y.o. female  who presents today for a postoperative visit.     Patient is here for a follow-up after a surgery for an excision of significant right axillary hidradenitis.  She is doing pretty well now.  Initially she had a lot of discomfort but that is getting better.  Her only problem is the fact that she is low on canisters because she was having to change the canisters more frequently because of quite a bit of drainage.  That seems to be getting better as well.    Past History:  Medical History: has a past medical history of Amenorrhea, Anemia, Boils, Congenital meatal stenosis, Constipation, GERD (gastroesophageal reflux disease), Glands swollen, Headache, Heart burn, Hidradenitis, Hidradenitis, Hidradenitis suppurativa, IC (interstitial cystitis), Insulin resistance (2018), and Psoriasis.   Surgical History: has a past surgical history that includes Adenoidectomy; Cervix surgery ();  section (); Cystoscopy; Salpingectomy (2021); Tonsillectomy (); Urethral Dilatation; Laser ablation (2021); [place] tooth extraction (2017); Tubal ligation; d & c hysteroscopy endometrial ablation; Ablation colpoclesis; and  "Axillary node dissection (Right, 1/18/2024).   Family History: family history includes Anxiety disorder in her mother and sister; Diabetes type II in her paternal grandfather and paternal grandmother; Heart disease in her mother; Hyperlipidemia in her paternal grandfather; Hypertension in her paternal grandfather; Hypothyroidism in her maternal grandmother, mother, sister, and another family member; Other in her father.   Social History: reports that she has quit smoking. Her smoking use included cigarettes. She started smoking about 14 years ago. She has a 10.00 pack-year smoking history. She has never used smokeless tobacco. She reports current alcohol use. She reports that she does not use drugs.  Allergies: Erythromycin and Sulfamethoxazole-trimethoprim       Current Outpatient Medications:   •  Cholecalciferol 25 MCG (1000 UT) tablet, Take 1 tablet by mouth Daily., Disp: , Rfl:   •  Humira Pen 80 MG/0.8ML injection, Inject 0.8 mL under the skin into the appropriate area as directed Every 14 (Fourteen) Days. LAST DOSE WAS 1-17-24, Disp: , Rfl:   •  HYDROcodone-acetaminophen (NORCO) 5-325 MG per tablet, Take 1 tablet by mouth Every 6 (Six) Hours As Needed for Moderate Pain (Pain)., Disp: 30 tablet, Rfl: 0  •  ondansetron (Zofran) 4 MG tablet, Take 1 tablet by mouth Daily As Needed for Nausea or Vomiting., Disp: 30 tablet, Rfl: 1  •  oxyCODONE-acetaminophen (Percocet) 7.5-325 MG per tablet, Take 1 tablet by mouth Every 6 (Six) Hours As Needed for Moderate Pain., Disp: 20 tablet, Rfl: 0  •  vitamin B-12 (CYANOCOBALAMIN) 100 MCG tablet, Take 0.5 tablets by mouth Daily., Disp: , Rfl:        Physical Exam  We had a picture from her last dressing change a couple days ago and that looks really good.  It is granulating well and did not have any significant exudate.  Objective    Vital Signs:   Resp 16   Ht 165.1 cm (65\")   Wt 98 kg (216 lb)   BMI 35.94 kg/m²              Assessment and Plan    Diagnoses and all orders " for this visit:    1. Hidradenitis suppurativa (Primary)    All in all she is doing well.  I told Paul that we will try to help her get some additional canisters since she had to change them more frequently due to her drainage.  I will see her back in 2 weeks for further follow-up.

## 2024-02-05 ENCOUNTER — HOSPITAL ENCOUNTER (OUTPATIENT)
Dept: INFUSION THERAPY | Facility: HOSPITAL | Age: 29
Discharge: HOME | End: 2024-02-05
Admitting: NURSE PRACTITIONER
Payer: COMMERCIAL

## 2024-02-05 VITALS
OXYGEN SATURATION: 100 % | SYSTOLIC BLOOD PRESSURE: 129 MMHG | RESPIRATION RATE: 16 BRPM | DIASTOLIC BLOOD PRESSURE: 72 MMHG | TEMPERATURE: 98.2 F | HEART RATE: 93 BPM

## 2024-02-05 DIAGNOSIS — Z51.89 VISIT FOR WOUND CHECK: Primary | ICD-10-CM

## 2024-02-05 PROCEDURE — 97605 NEG PRS WND THER DME<=50SQCM: CPT

## 2024-02-05 PROCEDURE — G0463 HOSPITAL OUTPT CLINIC VISIT: HCPCS

## 2024-02-05 NOTE — SIGNIFICANT NOTE
Wound Eval / Progress Noted     [facility]     Patient Name: Chetna Woody  : 1995  MRN: [id number]  Today's Date: 2024                 Admit Date: 2024    Visit Dx:  No diagnosis found.      * No active hospital problems. *        Past Medical History:   Diagnosis Date    Amenorrhea     Anemia     ASYMPTOMATIC    Boils     Congenital meatal stenosis     Constipation     GERD (gastroesophageal reflux disease)     Glands swollen     Headache     Heart burn     Hidradenitis     Hidradenitis     Hidradenitis suppurativa     IC (interstitial cystitis)     Insulin resistance 2018    Psoriasis         Past Surgical History:   Procedure Laterality Date    ABLATION COLPOCLESIS      ADENOIDECTOMY      AXILLARY NODE DISSECTION Right 2024    Procedure: AXILLARY HIDRADENITIS EXCISION;  Surgeon: Mart Frederick MD;  Location: [facility] [place];  Service: General;  Laterality: Right;    CERVIX SURGERY      cyst and scar tissue remover right cervical neck- LYMPH NODES REMOVED DELFIN     SECTION      baby girl    CYSTOSCOPY      D & C HYSTEROSCOPY ENDOMETRIAL ABLATION      LASER ABLATION  2021    Uterine    SALPINGECTOMY  2021    TONSILLECTOMY      TUBAL ABDOMINAL LIGATION      URETHRAL DILATATION      WISDOM TOOTH EXTRACTION  2017         Physical Assessment:  Wound Right mid axillary Incision (Active)   Wound Image   24 1220   Dressing Appearance intact;dry 24 1220   Closure None 24 1220   Base bleeding;red;granulating;moist 24 1220   Red (%), Wound Tissue Color 100 24 1220   Periwound intact;dry;redness 24 1220   Periwound Temperature warm 24 1220   Periwound Skin Turgor soft 24 1220   Edges open 24 1220   Wound Length (cm) 9.5 cm 24 1220   Wound Width (cm) 4 cm 24 1220   Wound Depth (cm) 0.9 cm 24 1220   Wound Surface Area (cm^2) 38 cm^2 24 1220   Wound Volume (cm^3) 34.2 cm^3 24 1220    Drainage Characteristics/Odor serosanguineous;sanguineous;bleeding controlled 02/05/24 1220   Drainage Amount moderate 02/05/24 1220   Care, Wound cleansed with;irrigated with;sterile normal saline;negative pressure wound therapy 02/05/24 1220   Dressing Care dressing applied 02/05/24 1220   Periwound Care barrier film applied 02/05/24 1220       NPWT (Negative Pressure Wound Therapy) 01/23/24 1018 right axilla (Active)   Therapy Setting continuous therapy 02/05/24 1220   Dressing foam, black 02/05/24 1220   Pressure Setting 125 mmHg 02/05/24 1220   Sponges Inserted 2 02/05/24 1220   Sponges Removed 2 02/05/24 1220   Finger sweep complete Yes 02/05/24 1220        Wound Check / Follow-up:  Patient seen today for a wound check and wound vac dressing changes. Patient accompanied by spouse.Wound vac functioning appropriately; no alarms noted. Patient does report that she did have a leak alarm over the weekend but she was able to troubleshoot the issue.    Dressing removed with no difficulties using NS and adhesive remover spray; patient tolerated well.    Wound base is beefy red and moist with granular tissue present to the wound bed. Periwound is pink and soft. Cleansed and irrigated with a copious amount of NS. Applied skin barrier to the periwound. Filled the wound with one black foam and secured with transparent drape. Applied transparent drape to the right chest wall and a second black foam connecting to the wound base as a track pad. Wound vac connected, foam compressed. No alarms noted at this time. Recommending to continue current wound vac therapy as the wound base is filling in and the wound edges are kiel.    Impression: surgical incision to the right axilla with secondary closure, negative pressure wound therapy    Short term goals:  regain skin integrity, skin protection, negative pressure wound therapy on M-W-F    Adrianne Mohamud RN    2/5/2024    13:43 EST

## 2024-02-07 ENCOUNTER — HOSPITAL ENCOUNTER (OUTPATIENT)
Dept: INFUSION THERAPY | Facility: HOSPITAL | Age: 29
Discharge: HOME | End: 2024-02-07
Admitting: NURSE PRACTITIONER
Payer: COMMERCIAL

## 2024-02-07 VITALS
DIASTOLIC BLOOD PRESSURE: 60 MMHG | HEART RATE: 79 BPM | RESPIRATION RATE: 18 BRPM | OXYGEN SATURATION: 96 % | SYSTOLIC BLOOD PRESSURE: 124 MMHG | TEMPERATURE: 97.4 F

## 2024-02-07 DIAGNOSIS — L73.2 HIDRADENITIS SUPPURATIVA: Primary | ICD-10-CM

## 2024-02-07 PROCEDURE — 97606 NEG PRS WND THER DME>50 SQCM: CPT

## 2024-02-07 PROCEDURE — 97605 NEG PRS WND THER DME<=50SQCM: CPT

## 2024-02-09 ENCOUNTER — HOSPITAL ENCOUNTER (OUTPATIENT)
Dept: INFUSION THERAPY | Facility: HOSPITAL | Age: 29
Discharge: HOME | End: 2024-02-09
Payer: COMMERCIAL

## 2024-02-09 VITALS
SYSTOLIC BLOOD PRESSURE: 137 MMHG | DIASTOLIC BLOOD PRESSURE: 72 MMHG | OXYGEN SATURATION: 100 % | TEMPERATURE: 98.2 F | RESPIRATION RATE: 20 BRPM | HEART RATE: 100 BPM

## 2024-02-09 DIAGNOSIS — L73.2 HIDRADENITIS SUPPURATIVA: Primary | ICD-10-CM

## 2024-02-09 PROCEDURE — 97605 NEG PRS WND THER DME<=50SQCM: CPT

## 2024-02-09 PROCEDURE — 97606 NEG PRS WND THER DME>50 SQCM: CPT

## 2024-02-12 ENCOUNTER — HOSPITAL ENCOUNTER (OUTPATIENT)
Dept: INFUSION THERAPY | Facility: HOSPITAL | Age: 29
Discharge: HOME | End: 2024-02-12
Admitting: NURSE PRACTITIONER
Payer: COMMERCIAL

## 2024-02-12 VITALS
HEART RATE: 91 BPM | OXYGEN SATURATION: 100 % | SYSTOLIC BLOOD PRESSURE: 127 MMHG | RESPIRATION RATE: 16 BRPM | TEMPERATURE: 98 F | DIASTOLIC BLOOD PRESSURE: 67 MMHG

## 2024-02-12 DIAGNOSIS — L73.2 HIDRADENITIS SUPPURATIVA: Primary | ICD-10-CM

## 2024-02-12 PROCEDURE — 97605 NEG PRS WND THER DME<=50SQCM: CPT

## 2024-02-13 ENCOUNTER — OFFICE VISIT (OUTPATIENT)
Dept: SURGERY | Facility: CLINIC | Age: 29
End: 2024-02-13
Payer: COMMERCIAL

## 2024-02-13 VITALS — WEIGHT: 216 LBS | HEIGHT: 65 IN | BODY MASS INDEX: 35.99 KG/M2 | RESPIRATION RATE: 18 BRPM

## 2024-02-13 DIAGNOSIS — L73.2 HIDRADENITIS SUPPURATIVA: Primary | ICD-10-CM

## 2024-02-13 PROCEDURE — 99024 POSTOP FOLLOW-UP VISIT: CPT | Performed by: SURGERY

## 2024-02-13 PROCEDURE — 1160F RVW MEDS BY RX/DR IN RCRD: CPT | Performed by: SURGERY

## 2024-02-13 PROCEDURE — 1159F MED LIST DOCD IN RCRD: CPT | Performed by: SURGERY

## 2024-02-13 RX ORDER — TRAMADOL HYDROCHLORIDE 50 MG/1
50 TABLET ORAL EVERY 6 HOURS PRN
Qty: 20 TABLET | Refills: 0 | Status: SHIPPED | OUTPATIENT
Start: 2024-02-13 | End: 2025-02-12

## 2024-02-14 ENCOUNTER — HOSPITAL ENCOUNTER (OUTPATIENT)
Dept: INFUSION THERAPY | Facility: HOSPITAL | Age: 29
Discharge: HOME | End: 2024-02-14
Admitting: NURSE PRACTITIONER
Payer: COMMERCIAL

## 2024-02-14 VITALS
RESPIRATION RATE: 20 BRPM | HEART RATE: 91 BPM | DIASTOLIC BLOOD PRESSURE: 52 MMHG | OXYGEN SATURATION: 99 % | SYSTOLIC BLOOD PRESSURE: 121 MMHG | TEMPERATURE: 98.4 F

## 2024-02-14 DIAGNOSIS — L73.2 HIDRADENITIS SUPPURATIVA: Primary | ICD-10-CM

## 2024-02-14 PROCEDURE — 97605 NEG PRS WND THER DME<=50SQCM: CPT

## 2024-02-16 ENCOUNTER — HOSPITAL ENCOUNTER (OUTPATIENT)
Dept: INFUSION THERAPY | Facility: HOSPITAL | Age: 29
Discharge: HOME | End: 2024-02-16
Payer: COMMERCIAL

## 2024-02-16 VITALS
RESPIRATION RATE: 18 BRPM | TEMPERATURE: 98.5 F | OXYGEN SATURATION: 98 % | DIASTOLIC BLOOD PRESSURE: 67 MMHG | HEART RATE: 110 BPM | SYSTOLIC BLOOD PRESSURE: 139 MMHG

## 2024-02-16 DIAGNOSIS — L73.2 HIDRADENITIS SUPPURATIVA: Primary | ICD-10-CM

## 2024-02-16 PROCEDURE — 97605 NEG PRS WND THER DME<=50SQCM: CPT

## 2024-02-19 ENCOUNTER — HOSPITAL ENCOUNTER (OUTPATIENT)
Dept: INFUSION THERAPY | Facility: HOSPITAL | Age: 29
Discharge: HOME | End: 2024-02-19
Admitting: NURSE PRACTITIONER
Payer: COMMERCIAL

## 2024-02-19 VITALS
DIASTOLIC BLOOD PRESSURE: 65 MMHG | OXYGEN SATURATION: 100 % | TEMPERATURE: 98.5 F | HEART RATE: 98 BPM | SYSTOLIC BLOOD PRESSURE: 143 MMHG | RESPIRATION RATE: 18 BRPM

## 2024-02-19 DIAGNOSIS — Z51.89 VISIT FOR WOUND CHECK: Primary | ICD-10-CM

## 2024-02-19 PROCEDURE — G0463 HOSPITAL OUTPT CLINIC VISIT: HCPCS

## 2024-02-19 NOTE — ADDENDUM NOTE
Encounter addended by: Eloisa Conklin RN on: 2/19/2024 5:16 PM   Actions taken: Clinical Note Signed, Flowsheet accepted

## 2024-02-19 NOTE — SIGNIFICANT NOTE
Wound Eval / Progress Noted     [facility]     Patient Name: Chetna Woody  : 1995  MRN: [id number]  Today's Date: 2024                 Admit Date: 2024    Visit Dx:  No diagnosis found.      * No active hospital problems. *        Past Medical History:   Diagnosis Date    Amenorrhea     Anemia     ASYMPTOMATIC    Boils     Congenital meatal stenosis     Constipation     GERD (gastroesophageal reflux disease)     Glands swollen     Headache     Heart burn     Hidradenitis     Hidradenitis     Hidradenitis suppurativa     IC (interstitial cystitis)     Insulin resistance 2018    Psoriasis         Past Surgical History:   Procedure Laterality Date    ABLATION COLPOCLESIS      ADENOIDECTOMY      AXILLARY NODE DISSECTION Right 2024    Procedure: AXILLARY HIDRADENITIS EXCISION;  Surgeon: Mart Frederick MD;  Location: [facility] OR [facility] [place];  Service: General;  Laterality: Right;    CERVIX SURGERY      cyst and scar tissue remover right cervical neck- LYMPH NODES REMOVED DELFIN     SECTION      baby girl    CYSTOSCOPY      D & C HYSTEROSCOPY ENDOMETRIAL ABLATION      LASER ABLATION  2021    Uterine    SALPINGECTOMY  2021    TONSILLECTOMY      TUBAL ABDOMINAL LIGATION      URETHRAL DILATATION      WISDOM TOOTH EXTRACTION  2017         Physical Assessment:  Wound Right mid axillary Incision (Active)   Wound Image   24 1025   Dressing Appearance moist drainage;intact 24 1025   Closure None 24 1025   Base bleeding;granulating;moist;red 24 1025   Periwound excoriated;redness 24 1025   Periwound Temperature warm 24 1025   Periwound Skin Turgor soft 24 1025   Edges open 24 1025   Wound Length (cm) 8 cm 24 1025   Wound Width (cm) 2.8 cm 24 1025   Wound Depth (cm) 0.9 cm 24 1025   Wound Surface Area (cm^2) 22.4 cm^2 24 1025   Wound Volume (cm^3) 20.16 cm^3 24 1025   Drainage Characteristics/Odor  bleeding controlled;serosanguineous 02/19/24 1025   Drainage Amount small 02/19/24 1025   Care, Wound cleansed with;sterile normal saline 02/19/24 1025   Dressing Care dressing applied;calcium alginate;silver impregnated;border dressing;hydrogel 02/19/24 1025   Periwound Care absorptive dressing applied 02/19/24 1025       NPWT (Negative Pressure Wound Therapy) 01/23/24 1018 right axilla (Active)   Therapy Setting vacuum off 02/19/24 1025   Sponges Removed 2 02/19/24 1025        Wound Check / Follow-up:  Patient seen today in [facility] for her follow up wound care visit. Patient's family member at the bedside. Patient was anxious about removing the wound vac but tolerated the removal well.     Wound RN cleansed the wound with NS. Wound bed is red, moist and granulating well. Some irritation noted around the wound edges. A small amount of red bleeding noted with black foam removal. Wound RN measured wound and obtained updated photos. Wound measuring at 8x2.8x0.9 cm.   Due to the depth and overall wound presentation, wound RN decided to leave the wound vac off at this time and transition to a silver impregnated calcium alginate wound dressing. RN cut 1 piece of the calcium alginate to fit the wound bed, then covered with a second piece of calcium alginate. RN covered and secured the wound with a border gauze dressing. The patient tolerated well. Patient is to return on wednesday for wound RN to assess the wound progress and current needs. Patient was given wound care supplies to take home incase her wound dressing became loose and needed to be changed. Recommended patient not return wound vac machine until response to local wound care can be assessed.     Impression: Right axilla wound     Short term goals:  Regain skin integrity, skin care, Every other day wound care. Follow up Wednesday for wound check and dressing response.     Eloisa Conkiln RN    2/19/2024    14:52 EST

## 2024-02-21 ENCOUNTER — HOSPITAL ENCOUNTER (OUTPATIENT)
Dept: INFUSION THERAPY | Facility: HOSPITAL | Age: 29
Discharge: HOME | End: 2024-02-21
Admitting: NURSE PRACTITIONER
Payer: COMMERCIAL

## 2024-02-21 VITALS
DIASTOLIC BLOOD PRESSURE: 74 MMHG | TEMPERATURE: 98.4 F | RESPIRATION RATE: 18 BRPM | HEART RATE: 97 BPM | OXYGEN SATURATION: 100 % | SYSTOLIC BLOOD PRESSURE: 122 MMHG

## 2024-02-21 DIAGNOSIS — L73.2 HIDRADENITIS SUPPURATIVA: Primary | ICD-10-CM

## 2024-02-21 PROCEDURE — G0463 HOSPITAL OUTPT CLINIC VISIT: HCPCS

## 2024-02-21 NOTE — SIGNIFICANT NOTE
Wound Eval / Progress Noted     [facility]     Patient Name: Chetna Woody  : 1995  MRN: [id number]  Today's Date: 2024                 Admit Date: 2024    Visit Dx:    ICD-10-CM ICD-9-CM   1. Hidradenitis suppurativa  L73.2 705.83         * No active hospital problems. *        Past Medical History:   Diagnosis Date    Amenorrhea     Anemia     ASYMPTOMATIC    Boils     Congenital meatal stenosis     Constipation     GERD (gastroesophageal reflux disease)     Glands swollen     Headache     Heart burn     Hidradenitis     Hidradenitis     Hidradenitis suppurativa     IC (interstitial cystitis)     Insulin resistance 2018    Psoriasis         Past Surgical History:   Procedure Laterality Date    ABLATION COLPOCLESIS      ADENOIDECTOMY      AXILLARY NODE DISSECTION Right 2024    Procedure: AXILLARY HIDRADENITIS EXCISION;  Surgeon: Mart Frederick MD;  Location: [facility] [place];  Service: General;  Laterality: Right;    CERVIX SURGERY      cyst and scar tissue remover right cervical neck- LYMPH NODES REMOVED DELFIN     SECTION      baby girl    CYSTOSCOPY      D & C HYSTEROSCOPY ENDOMETRIAL ABLATION      LASER ABLATION  2021    Uterine    SALPINGECTOMY  2021    TONSILLECTOMY      TUBAL ABDOMINAL LIGATION      URETHRAL DILATATION      WISDOM TOOTH EXTRACTION  2017         Physical Assessment:  Wound Right mid axillary Incision (Active)   Wound Image   24 09   Dressing Appearance intact;moist drainage 24 09   Closure None 24 09   Base bleeding;granulating;moist;red 24 0940   Periwound edematous;excoriated;moist;pink;redness 24 09   Periwound Temperature warm 2440   Periwound Skin Turgor soft 24 0940   Edges open 24 0940   Wound Length (cm) 8.2 cm 24 0940   Wound Width (cm) 3.2 cm 24 0940   Wound Depth (cm) 1.1 cm 24 0940   Wound Surface Area (cm^2) 26.24 cm^2 24 0940   Wound  Volume (cm^3) 28.864 cm^3 02/21/24 0940   Drainage Characteristics/Odor bleeding controlled;serosanguineous 02/21/24 0940   Drainage Amount small 02/21/24 0940   Care, Wound cleansed with;sterile normal saline 02/21/24 0940   Dressing Care dressing applied;border dressing;calcium alginate;gauze;silver impregnated;silicone 02/21/24 0940   Periwound Care absorptive dressing applied;barrier film applied 02/21/24 0940        Wound Check / Follow-up:  Patient returned to [facility] today for her follow up wound visit. Patient states that she had to change the dressing yesterday due to increased drainage. Patient states that her mother was able to perform the dressing change and reapplied the calcium alginate to the wound bed. Patient was less anxious during current visits and tolerated dressing change without issues.     Wound RN cleansed the wound with NS and pat dry. Wound bed is red, moist and with granulation. Some irritation and excoriation noted around the wound edges. A small amount of red bleeding noted to the wound bed with cleaning. Wound RN measured wound and obtained updated photos. Due to the current wound condition and progression recommending to continue current wound care orders and keep the wound vac off at this time. RN placed silver impregnated calcium alginate to the wound bed, covered with dry gauze and covered with a large silicone border dressing.             Impression:     Short term goals:      Eloisa Conklin RN    2/21/2024    12:49 EST

## 2024-02-21 NOTE — SIGNIFICANT NOTE
Wound Eval / Progress Noted     [facility]     Patient Name: Chetna Woody  : 1995  MRN: [id number]  Today's Date: 2024                 Admit Date: 2024    Visit Dx:    ICD-10-CM ICD-9-CM   1. Hidradenitis suppurativa  L73.2 705.83         * No active hospital problems. *        Past Medical History:   Diagnosis Date    Amenorrhea     Anemia     ASYMPTOMATIC    Boils     Congenital meatal stenosis     Constipation     GERD (gastroesophageal reflux disease)     Glands swollen     Headache     Heart burn     Hidradenitis     Hidradenitis     Hidradenitis suppurativa     IC (interstitial cystitis)     Insulin resistance 2018    Psoriasis         Past Surgical History:   Procedure Laterality Date    ABLATION COLPOCLESIS      ADENOIDECTOMY      AXILLARY NODE DISSECTION Right 2024    Procedure: AXILLARY HIDRADENITIS EXCISION;  Surgeon: Mart Frederick MD;  Location: [facility] [place] [facility] [place];  Service: General;  Laterality: Right;    CERVIX SURGERY      cyst and scar tissue remover right cervical neck- LYMPH NODES REMOVED DELFIN     SECTION      baby girl    CYSTOSCOPY      D & C HYSTEROSCOPY ENDOMETRIAL ABLATION      LASER ABLATION  2021    Uterine    SALPINGECTOMY  2021    TONSILLECTOMY      TUBAL ABDOMINAL LIGATION      URETHRAL DILATATION      WISDOM TOOTH EXTRACTION  2017         Physical Assessment:  Wound Right mid axillary Incision (Active)   Wound Image   24 09   Dressing Appearance intact;moist drainage 24 09   Closure None 24 09   Base bleeding;granulating;moist;red 24 0940   Periwound edematous;excoriated;moist;pink;redness 24 09   Periwound Temperature warm 2440   Periwound Skin Turgor soft 24 0940   Edges open 24 0940   Wound Length (cm) 8.2 cm 24 0940   Wound Width (cm) 3.2 cm 24 0940   Wound Depth (cm) 1.1 cm 24 0940   Wound Surface Area (cm^2) 26.24 cm^2 24 0940   Wound  Volume (cm^3) 28.864 cm^3 02/21/24 0940   Drainage Characteristics/Odor bleeding controlled;serosanguineous 02/21/24 0940   Drainage Amount small 02/21/24 0940   Care, Wound cleansed with;sterile normal saline 02/21/24 0940   Dressing Care dressing applied;border dressing;calcium alginate;gauze;silver impregnated;silicone 02/21/24 0940   Periwound Care absorptive dressing applied;barrier film applied 02/21/24 0940        Wound Check / Follow-up:  Patient seen today for her follow up wound visit in [facility]. Patient arrived independently today and stated that she had to change her wound dressing yesterday due to increased drainage. Patient states that her mother was able to perform her wound dressing change at home without issue. Patient was much less anxious today for her visit.     Wound RN removed patient's current dressing and cleansed wound with NS. Patient's wound bed is red, moist, and with granulation. Patient continues to have excoriation and irritation around the periwound. RN applied skin prep to the periwound and allowed to dry. Calcium alginate was cut to fit the wound bed and placed into the wound. RN applied dry 4x4 gauze over the calcium alginate due to increased drainage and covered with a large silicone border dressing. Patient tolerated well without issue. Patient is scheduled to return on Friday for her next [facility] appointment. Patient was sent home with additional wound care supplies to perform ordered dressings at home. Patient was instructed that she is now able to send her wound vac back to [facility]. Patient was able to verbalize understanding.     Impression: Right axilla wound     Short term goals:  Regain skin integrity, skin care, every other day wound care.     Eloisa Conklin RN    2/21/2024    13:19 EST

## 2024-02-23 ENCOUNTER — HOSPITAL ENCOUNTER (OUTPATIENT)
Dept: INFUSION THERAPY | Facility: HOSPITAL | Age: 29
Discharge: HOME | End: 2024-02-23
Payer: COMMERCIAL

## 2024-02-23 VITALS
HEART RATE: 109 BPM | SYSTOLIC BLOOD PRESSURE: 132 MMHG | OXYGEN SATURATION: 98 % | RESPIRATION RATE: 20 BRPM | DIASTOLIC BLOOD PRESSURE: 67 MMHG | TEMPERATURE: 98.1 F

## 2024-02-23 DIAGNOSIS — L73.2 HIDRADENITIS SUPPURATIVA: Primary | ICD-10-CM

## 2024-02-23 PROCEDURE — G0463 HOSPITAL OUTPT CLINIC VISIT: HCPCS

## 2024-02-26 ENCOUNTER — HOSPITAL ENCOUNTER (OUTPATIENT)
Dept: INFUSION THERAPY | Facility: HOSPITAL | Age: 29
Discharge: HOME | End: 2024-02-26
Admitting: NURSE PRACTITIONER
Payer: COMMERCIAL

## 2024-02-26 VITALS
HEART RATE: 93 BPM | TEMPERATURE: 98.2 F | RESPIRATION RATE: 18 BRPM | SYSTOLIC BLOOD PRESSURE: 130 MMHG | OXYGEN SATURATION: 99 % | DIASTOLIC BLOOD PRESSURE: 66 MMHG

## 2024-02-26 DIAGNOSIS — L73.2 HIDRADENITIS SUPPURATIVA: Primary | ICD-10-CM

## 2024-02-26 PROCEDURE — G0463 HOSPITAL OUTPT CLINIC VISIT: HCPCS

## 2024-02-26 NOTE — SIGNIFICANT NOTE
Wound Eval / Progress Noted     [facility]     Patient Name: Chetna Woody  : 1995  MRN: [id number]  Today's Date: 2024                 Admit Date: 2024    Visit Dx:    ICD-10-CM ICD-9-CM   1. Hidradenitis suppurativa  L73.2 705.83         * No active hospital problems. *        Past Medical History:   Diagnosis Date    Amenorrhea     Anemia     ASYMPTOMATIC    Boils     Congenital meatal stenosis     Constipation     GERD (gastroesophageal reflux disease)     Glands swollen     Headache     Heart burn     Hidradenitis     Hidradenitis     Hidradenitis suppurativa     IC (interstitial cystitis)     Insulin resistance 2018    Psoriasis         Past Surgical History:   Procedure Laterality Date    ABLATION COLPOCLESIS      ADENOIDECTOMY      AXILLARY NODE DISSECTION Right 2024    Procedure: AXILLARY HIDRADENITIS EXCISION;  Surgeon: Mart Frederick MD;  [place] [facility] [place] [facility] [place]  Service: General;  Laterality: Right;    CERVIX SURGERY      cyst and scar tissue remover right cervical neck- LYMPH NODES REMOVED DELFIN     SECTION      baby girl    CYSTOSCOPY      D & C HYSTEROSCOPY ENDOMETRIAL ABLATION      LASER ABLATION  2021    Uterine    SALPINGECTOMY  2021    TONSILLECTOMY      TUBAL ABDOMINAL LIGATION      URETHRAL DILATATION      WISDOM TOOTH EXTRACTION  2017         Physical Assessment:  Wound Right mid axillary Incision (Active)   Wound Image   24 1003   Dressing Appearance intact;dried drainage 24 1003   Closure None 24 1003   Base bleeding;moist;red 24 1003   Periwound intact;dry;pink;redness 24 1003   Periwound Temperature warm 24 1003   Periwound Skin Turgor soft 24 1003   Edges open 24 1003   Wound Length (cm) 7.6 cm 24 1003   Wound Width (cm) 3.3 cm 24 1003   Wound Depth (cm) 0.5 cm 24 1003   Wound Surface Area (cm^2) 25.08 cm^2 24 1003   Wound Volume (cm^3) 12.54 cm^3  02/26/24 1003   Drainage Characteristics/Odor bleeding controlled;serosanguineous 02/26/24 1003   Drainage Amount scant 02/26/24 1003   Care, Wound cleansed with;sterile normal saline;barrier applied 02/26/24 1003   Dressing Care dressing applied;calcium alginate;hydrofiber;border dressing;silicone 02/26/24 1003   Periwound Care absorptive dressing applied;barrier film applied 02/26/24 1003        Wound Check / Follow-up:  Patient seen today for her follow up wound care visit in [facility]. Patient denies any new issues or concerns since her last visit. Patient states that her mother has been performing her dressing changes if needed.     Wound RN removed patients current dressing and cleansed wound with NS. Patient's wound bed is moist, bleeding and with good granulation. Patient's periwound skin is pink and red with some irritation noted but no broken skin at this time. RN prepped patient's periwound skin with skin prep to offer added protection to her skin. RN obtained measurement and new photos. RN cut to fit silver impregnated calcium alginate into the wound bed and covered with dry gauze 4x4. RN secured dressing with a large silicone border dressing. Patient tolerated dressing change without issues. Recommending to continue current wound care orders at this time.     Impression: Right axilla wound with delayed healing     Short term goals:  Regain skin integrity, skin care, every other day wound care.      Eloisa Conklin RN    2/26/2024    12:07 EST

## 2024-02-27 ENCOUNTER — OFFICE VISIT (OUTPATIENT)
Dept: SURGERY | Facility: CLINIC | Age: 29
End: 2024-02-27
Payer: COMMERCIAL

## 2024-02-27 VITALS — RESPIRATION RATE: 16 BRPM | HEIGHT: 65 IN | WEIGHT: 219 LBS | BODY MASS INDEX: 36.49 KG/M2

## 2024-02-27 DIAGNOSIS — L73.2 HIDRADENITIS SUPPURATIVA: Primary | ICD-10-CM

## 2024-02-27 PROCEDURE — 99024 POSTOP FOLLOW-UP VISIT: CPT | Performed by: SURGERY

## 2024-02-27 PROCEDURE — 1160F RVW MEDS BY RX/DR IN RCRD: CPT | Performed by: SURGERY

## 2024-02-27 PROCEDURE — 1159F MED LIST DOCD IN RCRD: CPT | Performed by: SURGERY

## 2024-02-27 NOTE — PROGRESS NOTES
Chief Complaint  Hidradenitis and Post-op    Subjective          Chetna Woody presents to [facility]  History of Present Illness    Chetna Woody is a 28 y.o. female  who presents today for a postoperative visit.     Patient is here for a follow-up after an excision of a right axillary hidradenitis.  She is doing well and had no complaints today.    Past History:  Medical History: has a past medical history of Amenorrhea, Anemia, Boils, Congenital meatal stenosis, Constipation, GERD (gastroesophageal reflux disease), Glands swollen, Headache, Heart burn, Hidradenitis, Hidradenitis, Hidradenitis suppurativa, IC (interstitial cystitis), Insulin resistance (2018), and Psoriasis.   Surgical History: has a past surgical history that includes Adenoidectomy; Cervix surgery ();  section (); Cystoscopy; Salpingectomy (2021); Tonsillectomy (); Urethral Dilatation; Laser ablation (2021); [place] tooth extraction (2017); Tubal ligation; d & c hysteroscopy endometrial ablation; Ablation colpoclesis; and Axillary node dissection (Right, 2024).   Family History: family history includes Anxiety disorder in her mother and sister; Diabetes type II in her paternal grandfather and paternal grandmother; Heart disease in her mother; Hyperlipidemia in her paternal grandfather; Hypertension in her paternal grandfather; Hypothyroidism in her maternal grandmother, mother, sister, and another family member; Other in her father.   Social History: reports that she has quit smoking. Her smoking use included cigarettes. She started smoking about 14 years ago. She has a 10.00 pack-year smoking history. She has never used smokeless tobacco. She reports current alcohol use. She reports that she does not use drugs.  Allergies: Erythromycin and Sulfamethoxazole-trimethoprim       Current Outpatient Medications:     Cholecalciferol 25 MCG (1000 UT) tablet, Take 1  "tablet by mouth Daily., Disp: , Rfl:     Humira Pen 80 MG/0.8ML injection, Inject 0.8 mL under the skin into the appropriate area as directed Every 14 (Fourteen) Days. LAST DOSE WAS 1-17-24, Disp: , Rfl:     HYDROcodone-acetaminophen (NORCO) 5-325 MG per tablet, Take 1 tablet by mouth Every 6 (Six) Hours As Needed for Moderate Pain (Pain)., Disp: 30 tablet, Rfl: 0    ondansetron (Zofran) 4 MG tablet, Take 1 tablet by mouth Daily As Needed for Nausea or Vomiting., Disp: 30 tablet, Rfl: 1    oxyCODONE-acetaminophen (Percocet) 7.5-325 MG per tablet, Take 1 tablet by mouth Every 6 (Six) Hours As Needed for Moderate Pain., Disp: 20 tablet, Rfl: 0    traMADol (Ultram) 50 MG tablet, Take 1 tablet by mouth Every 6 (Six) Hours As Needed for Moderate Pain., Disp: 20 tablet, Rfl: 0    vitamin B-12 (CYANOCOBALAMIN) 100 MCG tablet, Take 0.5 tablets by mouth Daily., Disp: , Rfl:        Physical Exam  We reviewed her photographs from yesterday and the wound looks really good.  It is kiel and looks nice and healthy.  Objective     Vital Signs:   Resp 16   Ht 165.1 cm (65\")   Wt 99.3 kg (219 lb)   BMI 36.44 kg/m²              Assessment and Plan    Diagnoses and all orders for this visit:    1. Hidradenitis suppurativa (Primary)    We will have her continue her current wound care and then I will see her back in 4 weeks.      "

## 2024-02-28 ENCOUNTER — HOSPITAL ENCOUNTER (OUTPATIENT)
Dept: INFUSION THERAPY | Facility: HOSPITAL | Age: 29
Discharge: HOME | End: 2024-02-28
Admitting: NURSE PRACTITIONER
Payer: COMMERCIAL

## 2024-02-28 VITALS
OXYGEN SATURATION: 99 % | TEMPERATURE: 98.6 F | RESPIRATION RATE: 20 BRPM | DIASTOLIC BLOOD PRESSURE: 70 MMHG | HEART RATE: 109 BPM | SYSTOLIC BLOOD PRESSURE: 140 MMHG

## 2024-02-28 DIAGNOSIS — L73.2 HIDRADENITIS SUPPURATIVA: Primary | ICD-10-CM

## 2024-02-28 PROCEDURE — G0463 HOSPITAL OUTPT CLINIC VISIT: HCPCS

## 2024-03-01 ENCOUNTER — HOSPITAL ENCOUNTER (OUTPATIENT)
Dept: INFUSION THERAPY | Facility: HOSPITAL | Age: 29
Discharge: HOME | End: 2024-03-01
Payer: COMMERCIAL

## 2024-03-01 VITALS
SYSTOLIC BLOOD PRESSURE: 133 MMHG | HEART RATE: 106 BPM | TEMPERATURE: 98.2 F | OXYGEN SATURATION: 100 % | DIASTOLIC BLOOD PRESSURE: 65 MMHG | RESPIRATION RATE: 20 BRPM

## 2024-03-01 DIAGNOSIS — L73.2 HIDRADENITIS SUPPURATIVA: Primary | ICD-10-CM

## 2024-03-01 PROCEDURE — G0463 HOSPITAL OUTPT CLINIC VISIT: HCPCS

## 2024-03-04 ENCOUNTER — HOSPITAL ENCOUNTER (OUTPATIENT)
Dept: INFUSION THERAPY | Facility: HOSPITAL | Age: 29
Discharge: HOME | End: 2024-03-04
Admitting: NURSE PRACTITIONER
Payer: COMMERCIAL

## 2024-03-04 VITALS
HEART RATE: 106 BPM | OXYGEN SATURATION: 99 % | DIASTOLIC BLOOD PRESSURE: 69 MMHG | SYSTOLIC BLOOD PRESSURE: 140 MMHG | RESPIRATION RATE: 20 BRPM | TEMPERATURE: 98.4 F

## 2024-03-04 DIAGNOSIS — L73.2 HIDRADENITIS SUPPURATIVA: Primary | ICD-10-CM

## 2024-03-04 PROCEDURE — G0463 HOSPITAL OUTPT CLINIC VISIT: HCPCS

## 2024-03-04 NOTE — SIGNIFICANT NOTE
Wound Eval / Progress Noted     [facility]     Patient Name: Chetna Woody  : 1995  MRN: [id number]  Today's Date: 3/4/2024                 Admit Date: 3/4/2024    Visit Dx:    ICD-10-CM ICD-9-CM   1. Hidradenitis suppurativa  L73.2 705.83         * No active hospital problems. *        Past Medical History:   Diagnosis Date    Amenorrhea     Anemia     ASYMPTOMATIC    Boils     Congenital meatal stenosis     Constipation     GERD (gastroesophageal reflux disease)     Glands swollen     Headache     Heart burn     Hidradenitis     Hidradenitis     Hidradenitis suppurativa     IC (interstitial cystitis)     Insulin resistance 2018    Psoriasis         Past Surgical History:   Procedure Laterality Date    ABLATION COLPOCLESIS      ADENOIDECTOMY      AXILLARY NODE DISSECTION Right 2024    Procedure: AXILLARY HIDRADENITIS EXCISION;  Surgeon: Mart Frederick MD;  [place]: [facility] [place];  Service: General;  Laterality: Right;    CERVIX SURGERY      cyst and scar tissue remover right cervical neck- LYMPH NODES REMOVED DELFIN     SECTION      baby girl    CYSTOSCOPY      D & C HYSTEROSCOPY ENDOMETRIAL ABLATION      LASER ABLATION  2021    Uterine    SALPINGECTOMY  2021    TONSILLECTOMY      TUBAL ABDOMINAL LIGATION      URETHRAL DILATATION      WISDOM TOOTH EXTRACTION  2017         Physical Assessment:  Wound Right mid axillary Incision (Active)   Wound Image   24   Dressing Appearance moist drainage;intact 24   Closure None 24   Base moist;red 24   Periwound dry;intact;pink;redness 24   Periwound Temperature warm 24   Periwound Skin Turgor soft 24   Edges open 24   Wound Length (cm) 7.8 cm 24   Wound Width (cm) 4.7 cm 24   Wound Depth (cm) 0.3 cm 24   Wound Surface Area (cm^2) 36.66 cm^2 24   Wound Volume (cm^3) 10.998 cm^3 24    Drainage Characteristics/Odor serosanguineous;yellow 03/04/24 0926   Drainage Amount small 03/04/24 0926   Care, Wound cleansed with;irrigated with;sterile normal saline 03/04/24 0926   Dressing Care dressing applied;calcium alginate;silver impregnated;silicone;border dressing 03/04/24 0926   Periwound Care absorptive dressing applied 03/04/24 0926      Wound Check / Follow-up: Patient seen today for wound check / follow-up and dressing change in outpatient nursing services. Patient presents with intact dressing to right axilla with some moist drainage noted. She reports she had to change dressing over the weekend due to saturation. Removed dressing. Wound base to right axilla is red and moist. Periwound tissue remains with some redness but much improved compared to last assessment. Cleansed and irrigated wound with normal saline. Applied silver impregnated calcium alginate to wound then secured with silicone border dressing. Recommending to continue current treatment at this time.       Impression: Surgical wound to right axilla.      Short term goals: Regain skin integrity, skin protection, 3x weekly dressing changes and wound checks.      Jojo Buckner RN    3/4/2024    09:50 EST

## 2024-03-06 ENCOUNTER — HOSPITAL ENCOUNTER (OUTPATIENT)
Dept: INFUSION THERAPY | Facility: HOSPITAL | Age: 29
Discharge: HOME | End: 2024-03-06
Admitting: NURSE PRACTITIONER
Payer: COMMERCIAL

## 2024-03-06 VITALS
DIASTOLIC BLOOD PRESSURE: 66 MMHG | HEART RATE: 105 BPM | RESPIRATION RATE: 20 BRPM | SYSTOLIC BLOOD PRESSURE: 136 MMHG | TEMPERATURE: 98.5 F | OXYGEN SATURATION: 100 %

## 2024-03-06 DIAGNOSIS — L73.2 HIDRADENITIS SUPPURATIVA: Primary | ICD-10-CM

## 2024-03-06 PROCEDURE — G0463 HOSPITAL OUTPT CLINIC VISIT: HCPCS

## 2024-03-08 ENCOUNTER — HOSPITAL ENCOUNTER (OUTPATIENT)
Dept: INFUSION THERAPY | Facility: HOSPITAL | Age: 29
Discharge: HOME | End: 2024-03-08
Payer: COMMERCIAL

## 2024-03-08 VITALS
RESPIRATION RATE: 18 BRPM | SYSTOLIC BLOOD PRESSURE: 122 MMHG | HEART RATE: 97 BPM | DIASTOLIC BLOOD PRESSURE: 68 MMHG | TEMPERATURE: 98.5 F | OXYGEN SATURATION: 99 %

## 2024-03-08 DIAGNOSIS — L73.2 HIDRADENITIS SUPPURATIVA: Primary | ICD-10-CM

## 2024-03-08 PROCEDURE — G0463 HOSPITAL OUTPT CLINIC VISIT: HCPCS

## 2024-03-11 ENCOUNTER — HOSPITAL ENCOUNTER (OUTPATIENT)
Dept: INFUSION THERAPY | Facility: HOSPITAL | Age: 29
Discharge: HOME | End: 2024-03-11
Admitting: NURSE PRACTITIONER
Payer: COMMERCIAL

## 2024-03-11 VITALS
HEART RATE: 98 BPM | DIASTOLIC BLOOD PRESSURE: 60 MMHG | OXYGEN SATURATION: 99 % | RESPIRATION RATE: 20 BRPM | SYSTOLIC BLOOD PRESSURE: 115 MMHG | TEMPERATURE: 98.4 F

## 2024-03-11 DIAGNOSIS — L73.2 HIDRADENITIS SUPPURATIVA: Primary | ICD-10-CM

## 2024-03-11 PROCEDURE — G0463 HOSPITAL OUTPT CLINIC VISIT: HCPCS

## 2024-03-11 NOTE — SIGNIFICANT NOTE
Wound Eval / Progress Noted     [facility]     Patient Name: Chetna Woody  : 1995  MRN: [id number]  Today's Date: 3/11/2024                 Admit Date: 3/11/2024    Visit Dx:    ICD-10-CM ICD-9-CM   1. Hidradenitis suppurativa  L73.2 705.83         * No active hospital problems. *        Past Medical History:   Diagnosis Date    Amenorrhea     Anemia     ASYMPTOMATIC    Boils     Congenital meatal stenosis     Constipation     GERD (gastroesophageal reflux disease)     Glands swollen     Headache     Heart burn     Hidradenitis     Hidradenitis     Hidradenitis suppurativa     IC (interstitial cystitis)     Insulin resistance 2018    Psoriasis         Past Surgical History:   Procedure Laterality Date    ABLATION COLPOCLESIS      ADENOIDECTOMY      AXILLARY NODE DISSECTION Right 2024    Procedure: AXILLARY HIDRADENITIS EXCISION;  Surgeon: Mart Frederick MD;  Location: [facility] [place];  Service: General;  Laterality: Right;    CERVIX SURGERY      cyst and scar tissue remover right cervical neck- LYMPH NODES REMOVED DELFIN     SECTION      baby girl    CYSTOSCOPY      D & C HYSTEROSCOPY ENDOMETRIAL ABLATION      LASER ABLATION  2021    Uterine    SALPINGECTOMY  2021    TONSILLECTOMY      TUBAL ABDOMINAL LIGATION      URETHRAL DILATATION      WISDOM TOOTH EXTRACTION  2017         Physical Assessment:  Wound Right mid axillary Incision (Active)   Wound Image   24   Dressing Appearance moist drainage;intact 24   Closure None 24   Base red;moist 24   Periwound dry;intact;pink;redness 24   Periwound Temperature warm 24   Periwound Skin Turgor soft 24   Edges open 24   Wound Length (cm) 6.8 cm 24   Wound Width (cm) 3.9 cm 24   Wound Depth (cm) 0.3 cm 24   Wound Surface Area (cm^2) 26.52 cm^2 24   Wound Volume (cm^3) 7.956 cm^3 24    Drainage Characteristics/Odor serosanguineous;yellow 03/11/24 0934   Drainage Amount small 03/11/24 0934   Care, Wound cleansed with;irrigated with;sterile normal saline 03/11/24 0934   Dressing Care dressing applied;calcium alginate;silver impregnated;silicone;border dressing 03/11/24 0934   Periwound Care absorptive dressing applied 03/11/24 0934      Wound Check / Follow-up: Patient seen today for wound check / follow-up and dressing change in outpatient nursing services. Patient presents with intact dressing to right axilla with some moist drainage noted. She reports she had to change dressing over the weekend due to saturation. Removed dressing. Wound base to right axilla is red and moist. Periwound tissue remains with some redness. Pink epithelium noted to wound margins. Wound margins are kiel. Cleansed and irrigated wound with normal saline. Applied silver impregnated calcium alginate to wound then secured with silicone border dressing. Recommending to continue current treatment. Discussed with patient coming in for once weekly wound checks / follow-ups and changing dressings at home on  current schedule. Patient agreeable to this treatment plan. Supplies given. Instructed her to return sooner and / or call if any issues arise. She verbalized understanding.      Impression: Surgical wound to right axilla.       Short term goals: Regain skin integrity, skin protection, 3x weekly dressing changes, 1x weekly wound checks / follow-ups.      Jojo Buckner RN    3/11/2024    10:42 EDT

## 2024-03-20 ENCOUNTER — HOSPITAL ENCOUNTER (OUTPATIENT)
Dept: INFUSION THERAPY | Facility: HOSPITAL | Age: 29
Discharge: HOME | End: 2024-03-20
Admitting: NURSE PRACTITIONER
Payer: COMMERCIAL

## 2024-03-20 VITALS
RESPIRATION RATE: 20 BRPM | SYSTOLIC BLOOD PRESSURE: 117 MMHG | TEMPERATURE: 98.4 F | HEART RATE: 88 BPM | DIASTOLIC BLOOD PRESSURE: 67 MMHG | OXYGEN SATURATION: 100 %

## 2024-03-20 DIAGNOSIS — L73.2 HIDRADENITIS SUPPURATIVA: Primary | ICD-10-CM

## 2024-03-20 PROCEDURE — G0463 HOSPITAL OUTPT CLINIC VISIT: HCPCS

## 2024-03-20 NOTE — SIGNIFICANT NOTE
Wound Eval / Progress Noted     [facility]     Patient Name: Chetna Woody  : 1995  MRN: [id number]  Today's Date: 3/20/2024                 Admit Date: 3/20/2024    Visit Dx:    ICD-10-CM ICD-9-CM   1. Hidradenitis suppurativa  L73.2 705.83         * No active hospital problems. *        Past Medical History:   Diagnosis Date    Amenorrhea     Anemia     ASYMPTOMATIC    Boils     Congenital meatal stenosis     Constipation     GERD (gastroesophageal reflux disease)     Glands swollen     Headache     Heart burn     Hidradenitis     Hidradenitis     Hidradenitis suppurativa     IC (interstitial cystitis)     Insulin resistance 2018    Psoriasis         Past Surgical History:   Procedure Laterality Date    ABLATION COLPOCLESIS      ADENOIDECTOMY      AXILLARY NODE DISSECTION Right 2024    Procedure: AXILLARY HIDRADENITIS EXCISION;  Surgeon: Mart Frederick MD;  [place] [facility] [place] [facility] [place]  Service: General;  Laterality: Right;    CERVIX SURGERY      cyst and scar tissue remover right cervical neck- LYMPH NODES REMOVED DELFIN     SECTION      baby girl    CYSTOSCOPY      D & C HYSTEROSCOPY ENDOMETRIAL ABLATION      LASER ABLATION  2021    Uterine    SALPINGECTOMY  2021    TONSILLECTOMY      TUBAL ABDOMINAL LIGATION      URETHRAL DILATATION      WISDOM TOOTH EXTRACTION  2017         Physical Assessment:  Wound Right mid axillary Incision (Active)   Wound Image   24   Dressing Appearance intact;moist drainage 24   Closure None 24 09   Base moist;red;epithelialization 24 0935   Red (%), Wound Tissue Color 100 24 09   Periwound intact;pink;redness 24 0935   Periwound Temperature warm 24   Periwound Skin Turgor soft 24 0935   Edges open 24 0935   Wound Length (cm) 2.3 cm 24 0935   Wound Width (cm) 4.8 cm 24 0935   Wound Depth (cm) 0.1 cm 24 0935   Wound Surface Area (cm^2) 11.04 cm^2  03/20/24 0935   Wound Volume (cm^3) 1.104 cm^3 03/20/24 0935   Drainage Characteristics/Odor serosanguineous 03/20/24 0935   Drainage Amount small 03/20/24 0935   Care, Wound cleansed with;irrigated with;sterile normal saline 03/20/24 0935   Dressing Care dressing applied;border dressing;silicone;calcium alginate;hydrofiber 03/20/24 0935   Periwound Care absorptive dressing applied 03/20/24 0935       NPWT (Negative Pressure Wound Therapy) 01/23/24 1018 right axilla (Active)   Therapy Setting vacuum off 03/20/24 0935        Wound Check / Follow-up:  Patient seen today for wound follow-up. Patient is accompanied by her spouse today.   Patient has been continuing dressing changes every other day but they have been getting done at home with weekly checks here by wound care.   Dressing removed. Serosanguinous drainage noted dressing.   Wound base and loi-wound tissue cleansed with NS and gauze. Blotted dry.   Wound base is red and moist with increased epithelialization along wound margins. Wound base is very shallow and no longer requires packing but instead just topical or surface dressings.   Recommending to continue calcium alginate dressings with silver and to secure with silicone border dressing.   Discussed with patient that if wound stalling is noted, that collagen can be considered to assist with complete closure and wound healing. Patient verbalized understanding.     Patient also continues to have edema to her right arm. She states that surgeon did remove lymph nodes to the area. She has purchased a compression sleep and will be wearing that, however there is discussion about a possible referral to the lymphedema clinic for custom garment or management.     Impression: Surgical incision to right axilla with closure by secondary intent    Short term goals:  Regain skin integrity. Dressing changes every other day. Skin protection    Cynthia Raya RN    3/20/2024    10:25 EDT

## 2024-03-26 ENCOUNTER — OFFICE VISIT (OUTPATIENT)
Dept: SURGERY | Facility: CLINIC | Age: 29
End: 2024-03-26
Payer: COMMERCIAL

## 2024-03-26 VITALS — HEIGHT: 65 IN | RESPIRATION RATE: 16 BRPM | BODY MASS INDEX: 35.32 KG/M2 | WEIGHT: 212 LBS

## 2024-03-26 DIAGNOSIS — L73.2 HIDRADENITIS SUPPURATIVA: Primary | ICD-10-CM

## 2024-03-26 PROCEDURE — 1160F RVW MEDS BY RX/DR IN RCRD: CPT | Performed by: SURGERY

## 2024-03-26 PROCEDURE — 1159F MED LIST DOCD IN RCRD: CPT | Performed by: SURGERY

## 2024-03-26 PROCEDURE — 99024 POSTOP FOLLOW-UP VISIT: CPT | Performed by: SURGERY

## 2024-03-26 NOTE — LETTER
2024     PHYLICIA Pat  [address] [place] [address]    Patient: Chetna Woody   YOB: 1995   Date of Visit: 3/26/2024     Dear PHYLICIA Pat:       Thank you for referring Chetna Woody to me for evaluation. Below are the relevant portions of my assessment and plan of care.    If you have questions, please do not hesitate to call me. I look forward to following Chetna along with you.         Sincerely,        Mart Frederick MD        CC: No Recipients    Mart Frederick MD  24 1300  Sign when Signing Visit  Chief Complaint  Post-op and Hidradenitis    Subjective         Chetna Woody presents to [facility] GENERAL SURGERY  History of Present Illness    Chetna Woody is a 28 y.o. female  who presents today for a postoperative visit.     Patient is here for a follow-up after an excision of right axillary hidradenitis.  She is doing great and had no complaints today.  We had a picture from a dressing change on 3/20/2024 and that wound looks really good.  It is contracted a lot and is granulating nicely.    Past History:  Medical History: has a past medical history of Amenorrhea, Anemia, Boils, Congenital meatal stenosis, Constipation, GERD (gastroesophageal reflux disease), Glands swollen, Headache, Heart burn, Hidradenitis, Hidradenitis, Hidradenitis suppurativa, IC (interstitial cystitis), Insulin resistance (2018), and Psoriasis.   Surgical History: has a past surgical history that includes Adenoidectomy; Cervix surgery ();  section (); Cystoscopy; Salpingectomy (2021); Tonsillectomy (); Urethral Dilatation; Laser ablation (2021); [place] tooth extraction (2017); Tubal ligation; d & c hysteroscopy endometrial ablation; Ablation colpoclesis; and Axillary node dissection (Right, 2024).   Family History: family history includes Anxiety disorder in her mother and sister;  "Diabetes type II in her paternal grandfather and paternal grandmother; Heart disease in her mother; Hyperlipidemia in her paternal grandfather; Hypertension in her paternal grandfather; Hypothyroidism in her maternal grandmother, mother, sister, and another family member; Other in her father.   Social History: reports that she has quit smoking. Her smoking use included cigarettes. She started smoking about 14 years ago. She has a 14.2 pack-year smoking history. She has never used smokeless tobacco. She reports current alcohol use. She reports that she does not use drugs.  Allergies: Erythromycin and Sulfamethoxazole-trimethoprim       Current Outpatient Medications:   •  Cholecalciferol 25 MCG (1000 UT) tablet, Take 1 tablet by mouth Daily., Disp: , Rfl:   •  Humira Pen 80 MG/0.8ML injection, Inject 0.8 mL under the skin into the appropriate area as directed Every 14 (Fourteen) Days. LAST DOSE WAS 1-17-24, Disp: , Rfl:   •  HYDROcodone-acetaminophen (NORCO) 5-325 MG per tablet, Take 1 tablet by mouth Every 6 (Six) Hours As Needed for Moderate Pain (Pain)., Disp: 30 tablet, Rfl: 0  •  ondansetron (Zofran) 4 MG tablet, Take 1 tablet by mouth Daily As Needed for Nausea or Vomiting., Disp: 30 tablet, Rfl: 1  •  oxyCODONE-acetaminophen (Percocet) 7.5-325 MG per tablet, Take 1 tablet by mouth Every 6 (Six) Hours As Needed for Moderate Pain., Disp: 20 tablet, Rfl: 0  •  traMADol (Ultram) 50 MG tablet, Take 1 tablet by mouth Every 6 (Six) Hours As Needed for Moderate Pain., Disp: 20 tablet, Rfl: 0  •  vitamin B-12 (CYANOCOBALAMIN) 100 MCG tablet, Take 0.5 tablets by mouth Daily., Disp: , Rfl:        Physical Exam  He appears well today.  Her abdomen is soft.  Objective    Vital Signs:   Resp 16   Ht 165.1 cm (65\")   Wt 96.2 kg (212 lb)   BMI 35.28 kg/m²              Assessment and Plan    Diagnoses and all orders for this visit:    1. Hidradenitis suppurativa (Primary)    At this point she seems to be doing great.  I " will see her back on an as-needed basis.  I have asked her to call me should she have any further questions.

## 2024-03-26 NOTE — PROGRESS NOTES
Chief Complaint  Post-op and Hidradenitis    Subjective          Chetna Woody presents to [facility] GENERAL SURGERY  History of Present Illness    Chetna Woody is a 28 y.o. female  who presents today for a postoperative visit.     Patient is here for a follow-up after an excision of right axillary hidradenitis.  She is doing great and had no complaints today.  We had a picture from a dressing change on 3/20/2024 and that wound looks really good.  It is contracted a lot and is granulating nicely.    Past History:  Medical History: has a past medical history of Amenorrhea, Anemia, Boils, Congenital meatal stenosis, Constipation, GERD (gastroesophageal reflux disease), Glands swollen, Headache, Heart burn, Hidradenitis, Hidradenitis, Hidradenitis suppurativa, IC (interstitial cystitis), Insulin resistance (2018), and Psoriasis.   Surgical History: has a past surgical history that includes Adenoidectomy; Cervix surgery ();  section (); Cystoscopy; Salpingectomy (2021); Tonsillectomy (); Urethral Dilatation; Laser ablation (2021); [place] tooth extraction (2017); Tubal ligation; d & c hysteroscopy endometrial ablation; Ablation colpoclesis; and Axillary node dissection (Right, 2024).   Family History: family history includes Anxiety disorder in her mother and sister; Diabetes type II in her paternal grandfather and paternal grandmother; Heart disease in her mother; Hyperlipidemia in her paternal grandfather; Hypertension in her paternal grandfather; Hypothyroidism in her maternal grandmother, mother, sister, and another family member; Other in her father.   Social History: reports that she has quit smoking. Her smoking use included cigarettes. She started smoking about 14 years ago. She has a 14.2 pack-year smoking history. She has never used smokeless tobacco. She reports current alcohol use. She reports that she does not use drugs.  Allergies:  "Erythromycin and Sulfamethoxazole-trimethoprim       Current Outpatient Medications:     Cholecalciferol 25 MCG (1000 UT) tablet, Take 1 tablet by mouth Daily., Disp: , Rfl:     Humira Pen 80 MG/0.8ML injection, Inject 0.8 mL under the skin into the appropriate area as directed Every 14 (Fourteen) Days. LAST DOSE WAS 1-17-24, Disp: , Rfl:     HYDROcodone-acetaminophen (NORCO) 5-325 MG per tablet, Take 1 tablet by mouth Every 6 (Six) Hours As Needed for Moderate Pain (Pain)., Disp: 30 tablet, Rfl: 0    ondansetron (Zofran) 4 MG tablet, Take 1 tablet by mouth Daily As Needed for Nausea or Vomiting., Disp: 30 tablet, Rfl: 1    oxyCODONE-acetaminophen (Percocet) 7.5-325 MG per tablet, Take 1 tablet by mouth Every 6 (Six) Hours As Needed for Moderate Pain., Disp: 20 tablet, Rfl: 0    traMADol (Ultram) 50 MG tablet, Take 1 tablet by mouth Every 6 (Six) Hours As Needed for Moderate Pain., Disp: 20 tablet, Rfl: 0    vitamin B-12 (CYANOCOBALAMIN) 100 MCG tablet, Take 0.5 tablets by mouth Daily., Disp: , Rfl:        Physical Exam  He appears well today.  Her abdomen is soft.  Objective     Vital Signs:   Resp 16   Ht 165.1 cm (65\")   Wt 96.2 kg (212 lb)   BMI 35.28 kg/m²              Assessment and Plan    Diagnoses and all orders for this visit:    1. Hidradenitis suppurativa (Primary)    At this point she seems to be doing great.  I will see her back on an as-needed basis.  I have asked her to call me should she have any further questions.      "

## 2024-03-27 ENCOUNTER — HOSPITAL ENCOUNTER (OUTPATIENT)
Dept: INFUSION THERAPY | Facility: HOSPITAL | Age: 29
Discharge: HOME | End: 2024-03-27
Admitting: NURSE PRACTITIONER
Payer: COMMERCIAL

## 2024-03-27 VITALS
SYSTOLIC BLOOD PRESSURE: 113 MMHG | HEART RATE: 99 BPM | OXYGEN SATURATION: 99 % | RESPIRATION RATE: 18 BRPM | TEMPERATURE: 99 F | DIASTOLIC BLOOD PRESSURE: 65 MMHG

## 2024-03-27 DIAGNOSIS — L73.2 HIDRADENITIS SUPPURATIVA: Primary | ICD-10-CM

## 2024-03-27 PROCEDURE — G0463 HOSPITAL OUTPT CLINIC VISIT: HCPCS

## 2024-03-27 NOTE — SIGNIFICANT NOTE
Wound Eval / Progress Noted     [facility]     Patient Name: Chetna Woody  : 1995  MRN: [id number]  Today's Date: 3/27/2024                 Admit Date: 3/27/2024    Visit Dx:    ICD-10-CM ICD-9-CM   1. Hidradenitis suppurativa  L73.2 705.83         * No active hospital problems. *        Past Medical History:   Diagnosis Date    Amenorrhea     Anemia     ASYMPTOMATIC    Boils     Congenital meatal stenosis     Constipation     GERD (gastroesophageal reflux disease)     Glands swollen     Headache     Heart burn     Hidradenitis     Hidradenitis     Hidradenitis suppurativa     IC (interstitial cystitis)     Insulin resistance 2018    Psoriasis         Past Surgical History:   Procedure Laterality Date    ABLATION COLPOCLESIS      ADENOIDECTOMY      AXILLARY NODE DISSECTION Right 2024    Procedure: AXILLARY HIDRADENITIS EXCISION;  Surgeon: Mart Frederick MD;  [facility] [facility] [place] [facility] [place]  Service: General;  Laterality: Right;    CERVIX SURGERY      cyst and scar tissue remover right cervical neck- LYMPH NODES REMOVED DELFIN     SECTION      baby girl    CYSTOSCOPY      D & C HYSTEROSCOPY ENDOMETRIAL ABLATION      LASER ABLATION  2021    Uterine    SALPINGECTOMY  2021    TONSILLECTOMY      TUBAL ABDOMINAL LIGATION      URETHRAL DILATATION      WISDOM TOOTH EXTRACTION  2017         Physical Assessment:  Wound Right mid axillary Incision (Active)   Wound Image   24   Dressing Appearance moist drainage;intact 24   Closure None 24   Base moist;red;epithelialization 24   Periwound dry;intact;pink;redness 24   Periwound Temperature warm 24   Periwound Skin Turgor soft 24   Wound Length (cm) 1.3 cm 24   Wound Width (cm) 3.7 cm 24   Wound Depth (cm) 0.1 cm 24   Wound Surface Area (cm^2) 4.81 cm^2 24   Wound Volume (cm^3) 0.481 cm^3 24   Drainage  Characteristics/Odor serosanguineous 03/27/24 0924   Drainage Amount small 03/27/24 0924   Care, Wound cleansed with;irrigated with;sterile normal saline 03/27/24 0924   Dressing Care dressing applied;silver impregnated;calcium alginate;silicone;border dressing 03/27/24 0924   Periwound Care absorptive dressing applied 03/27/24 0924      Wound Check / Follow-up: Patient seen today for wound follow-up and dressing change in outpatient nursing services. Patient continues to perform wound care every other day at home with once weekly wound follow-up with wound nurse. She reports no issues or concerns managing this wound care at home.    Dressing intact to right axilla with small amount of serosanguineous dressing noted. Removed dressing. Wound base to right axilla with red, moist tissue. Pink epithelium noted to wound margins. Cleansed and irrigated wound with normal saline. Applied silver impregnated calcium alginate and secured with silicone border dressing. Recommending to continue current treatment.    Patient wearing compression sleeve to right arm to assist with edema.        Impression: Surgical wound to right axilla.      Short term goals: Regain skin integrity, skin protection, every other day dressing changes, weekly wound follow-ups.       Jojo Buckner RN    3/27/2024    12:38 EDT

## 2024-04-03 ENCOUNTER — HOSPITAL ENCOUNTER (OUTPATIENT)
Dept: INFUSION THERAPY | Facility: HOSPITAL | Age: 29
Discharge: HOME | End: 2024-04-03
Admitting: NURSE PRACTITIONER
Payer: COMMERCIAL

## 2024-04-03 VITALS
TEMPERATURE: 98.7 F | DIASTOLIC BLOOD PRESSURE: 71 MMHG | SYSTOLIC BLOOD PRESSURE: 128 MMHG | OXYGEN SATURATION: 98 % | HEART RATE: 112 BPM | RESPIRATION RATE: 20 BRPM

## 2024-04-03 DIAGNOSIS — L73.2 HIDRADENITIS SUPPURATIVA: Primary | ICD-10-CM

## 2024-04-03 PROCEDURE — G0463 HOSPITAL OUTPT CLINIC VISIT: HCPCS

## 2024-04-03 NOTE — SIGNIFICANT NOTE
Wound Eval / Progress Noted     [facility]     Patient Name: Chetna Woody  : 1995  MRN: [id number]  Today's Date: 4/3/2024                 Admit Date: 4/3/2024    Visit Dx:    ICD-10-CM ICD-9-CM   1. Hidradenitis suppurativa  L73.2 705.83         * No active hospital problems. *        Past Medical History:   Diagnosis Date    Amenorrhea     Anemia     ASYMPTOMATIC    Boils     Congenital meatal stenosis     Constipation     GERD (gastroesophageal reflux disease)     Glands swollen     Headache     Heart burn     Hidradenitis     Hidradenitis     Hidradenitis suppurativa     IC (interstitial cystitis)     Insulin resistance 2018    Psoriasis         Past Surgical History:   Procedure Laterality Date    ABLATION COLPOCLESIS      ADENOIDECTOMY      AXILLARY NODE DISSECTION Right 2024    Procedure: AXILLARY HIDRADENITIS EXCISION;  Surgeon: Mart Frederick MD;  [place] [facility] [place] [facility] [place]  Service: General;  Laterality: Right;    CERVIX SURGERY      cyst and scar tissue remover right cervical neck- LYMPH NODES REMOVED DELFIN     SECTION      baby girl    CYSTOSCOPY      D & C HYSTEROSCOPY ENDOMETRIAL ABLATION      LASER ABLATION  2021    Uterine    SALPINGECTOMY  2021    TONSILLECTOMY      TUBAL ABDOMINAL LIGATION      URETHRAL DILATATION      WISDOM TOOTH EXTRACTION  2017         Physical Assessment:  Wound Right mid axillary Incision (Active)   Wound Image   24 0915   Dressing Appearance intact;moist drainage 24 0915   Closure None 24 0915   Base moist;red 24 0915   Red (%), Wound Tissue Color 100 24 0915   Periwound pink;macerated;moist 24 0915   Periwound Temperature warm 24 0915   Periwound Skin Turgor soft 24 0915   Edges open 24 0915   Wound Length (cm) 1 cm 24 0915   Wound Width (cm) 3.5 cm 24 0915   Wound Depth (cm) 0.1 cm 24 0915   Wound Surface Area (cm^2) 3.5 cm^2 24 0915   Wound  Volume (cm^3) 0.35 cm^3 04/03/24 0915   Drainage Characteristics/Odor serosanguineous 04/03/24 0915   Drainage Amount small 04/03/24 0915   Care, Wound cleansed with;sterile normal saline 04/03/24 0915   Dressing Care dressing applied;border dressing;silicone;silver impregnated;calcium alginate 04/03/24 0915   Periwound Care absorptive dressing applied 04/03/24 0915        Wound Check / Follow-up:  Patient seen today for wound follow-up / wound check. Patient presents with intact dressing. She continues to have some chronic swelling to her right upper arm but states that she has not applied compression to the arm for the past four days as she has been sick.     Dressing removed. Wound and loi-wound tissue cleansed with NS and gauze. Blotted dry. Wound base is red and moist and continues to respond well to treatment. Wound margins are approximating and closing well. Center portion of wound is attempting to form a bridge of closure. Recommending to continue current wound care with silver impregnated calcium alginate and silicone border dressings every other day with weekly checks.       Impression: Surgical wound to right axilla with closure by secondary intent    Short term goals:  Regain skin integrity. Dressing changes every other day. Weekly checks.     Cynthia Raya RN    4/3/2024    09:39 EDT

## 2024-04-10 ENCOUNTER — HOSPITAL ENCOUNTER (OUTPATIENT)
Dept: INFUSION THERAPY | Facility: HOSPITAL | Age: 29
Discharge: HOME | End: 2024-04-10
Admitting: NURSE PRACTITIONER
Payer: COMMERCIAL

## 2024-04-10 VITALS
DIASTOLIC BLOOD PRESSURE: 61 MMHG | SYSTOLIC BLOOD PRESSURE: 127 MMHG | RESPIRATION RATE: 20 BRPM | OXYGEN SATURATION: 100 % | TEMPERATURE: 97.5 F | HEART RATE: 99 BPM

## 2024-04-10 DIAGNOSIS — L73.2 HIDRADENITIS SUPPURATIVA: Primary | ICD-10-CM

## 2024-04-10 PROCEDURE — G0463 HOSPITAL OUTPT CLINIC VISIT: HCPCS

## 2024-04-10 NOTE — SIGNIFICANT NOTE
Wound Eval / Progress Noted     [facility]     Patient Name: Chetna Woody  : 1995  MRN: [id number]  Today's Date: 4/10/2024                 Admit Date: 4/10/2024    Visit Dx:    ICD-10-CM ICD-9-CM   1. Hidradenitis suppurativa  L73.2 705.83         * No active hospital problems. *        Past Medical History:   Diagnosis Date    Amenorrhea     Anemia     ASYMPTOMATIC    Boils     Congenital meatal stenosis     Constipation     GERD (gastroesophageal reflux disease)     Glands swollen     Headache     Heart burn     Hidradenitis     Hidradenitis     Hidradenitis suppurativa     IC (interstitial cystitis)     Insulin resistance 2018    Psoriasis         Past Surgical History:   Procedure Laterality Date    ABLATION COLPOCLESIS      ADENOIDECTOMY      AXILLARY NODE DISSECTION Right 2024    Procedure: AXILLARY HIDRADENITIS EXCISION;  Surgeon: Mart Frederick MD;  [facility];  Service: General;  Laterality: Right;    CERVIX SURGERY      cyst and scar tissue remover right cervical neck- LYMPH NODES REMOVED DELFIN     SECTION      baby girl    CYSTOSCOPY      D & C HYSTEROSCOPY ENDOMETRIAL ABLATION      LASER ABLATION  2021    Uterine    SALPINGECTOMY  2021    TONSILLECTOMY      TUBAL ABDOMINAL LIGATION      URETHRAL DILATATION      WISDOM TOOTH EXTRACTION  2017         Physical Assessment:  Wound Right mid axillary Incision (Active)   Wound Image   04/10/24 0918   Dressing Appearance intact;moist drainage 04/10/24 0918   Closure None 04/10/24 0918   Base moist;red 04/10/24 0918   Red (%), Wound Tissue Color 100 04/10/24 0918   Periwound redness;warm;moist 04/10/24 0918   Periwound Temperature warm 04/10/24 0918   Periwound Skin Turgor soft 04/10/24 0918   Edges open 04/10/24 0918   Wound Length (cm) 0.5 cm 04/10/24 0918   Wound Width (cm) 1 cm 04/10/24 0918   Wound Depth (cm) 0.1 cm 04/10/24 0918   Wound Surface Area (cm^2) 0.5 cm^2 04/10/24 0918   Wound  Volume (cm^3) 0.05 cm^3 04/10/24 0918   Drainage Characteristics/Odor serosanguineous 04/10/24 0918   Drainage Amount small 04/10/24 0918   Care, Wound cleansed with;irrigated with;sterile normal saline 04/10/24 0918   Dressing Care dressing applied;calcium alginate;silver impregnated;non-adherent 04/10/24 0918   Periwound Care absorptive dressing applied 04/10/24 0918        Wound Check / Follow-up:  Patient seen today for wound follow-up / wound check. Patient with intact dressing upon arrival.     Dressing removed. Site cleansed with NS and gauze. Wound base continues to improve and respond to treatment. Wound margins are kiel and epithelialization is increasing. There are however two new areas of superficial tissue loss noted. One to proximal and one to distal axilla, or above and below wound. Additionally there is also some loi-wound irritation and maceration. Patient states that the dressing has pulled her skin. Cleansed all areas again with NS and gauze. Recommending to continue the silver impregnated calcium alginate but not to apply adhesive dressing but instead to cover with non-adherent gauze (telfa) and to secure with paper tape. Encouraging patient to change dressing daily after shower to keep skin dry and to promote care to the site.     Recommending patient follow-up in one week for wound check.       Impression: Surgical wound to right axilla with closure by secondary intent. Traumatic tissue injuries related to dressings    Short term goals:  Regain skin integrity. Daily dressing changes. Skin protection and moisture prevention.     Cynthia Raya RN    4/10/2024    10:44 EDT

## 2024-04-17 ENCOUNTER — HOSPITAL ENCOUNTER (OUTPATIENT)
Dept: INFUSION THERAPY | Facility: HOSPITAL | Age: 29
Discharge: HOME | End: 2024-04-17
Admitting: NURSE PRACTITIONER
Payer: COMMERCIAL

## 2024-04-17 VITALS
HEART RATE: 104 BPM | DIASTOLIC BLOOD PRESSURE: 63 MMHG | TEMPERATURE: 98.5 F | SYSTOLIC BLOOD PRESSURE: 120 MMHG | RESPIRATION RATE: 20 BRPM | OXYGEN SATURATION: 99 %

## 2024-04-17 DIAGNOSIS — L73.2 HIDRADENITIS SUPPURATIVA: Primary | ICD-10-CM

## 2024-04-17 PROCEDURE — G0463 HOSPITAL OUTPT CLINIC VISIT: HCPCS

## 2024-04-17 NOTE — SIGNIFICANT NOTE
Wound Eval / Progress Noted     [facility]     Patient Name: Chetna Woody  : 1995  MRN: [id number]  Today's Date: 2024                 Admit Date: 2024    Visit Dx:    ICD-10-CM ICD-9-CM   1. Hidradenitis suppurativa  L73.2 705.83         * No active hospital problems. *        Past Medical History:   Diagnosis Date    Amenorrhea     Anemia     ASYMPTOMATIC    Boils     Congenital meatal stenosis     Constipation     GERD (gastroesophageal reflux disease)     Glands swollen     Headache     Heart burn     Hidradenitis     Hidradenitis     Hidradenitis suppurativa     IC (interstitial cystitis)     Insulin resistance 2018    Psoriasis         Past Surgical History:   Procedure Laterality Date    ABLATION COLPOCLESIS      ADENOIDECTOMY      AXILLARY NODE DISSECTION Right 2024    Procedure: AXILLARY HIDRADENITIS EXCISION;  Surgeon: Mart Frederick MD;  [place]: [facility] [place] [facility] [place];  Service: General;  Laterality: Right;    CERVIX SURGERY      cyst and scar tissue remover right cervical neck- LYMPH NODES REMOVED DELFIN     SECTION      baby girl    CYSTOSCOPY      D & C HYSTEROSCOPY ENDOMETRIAL ABLATION      LASER ABLATION  2021    Uterine    SALPINGECTOMY  2021    TONSILLECTOMY      TUBAL ABDOMINAL LIGATION      URETHRAL DILATATION      WISDOM TOOTH EXTRACTION  2017         Physical Assessment:     24 0923   Wound Right mid axillary Incision   No placement date or time found.   Present on Original Admission: Yes  Side: Right  Location: mid axillary  Primary Wound Type: Incision   Wound Image    Dressing Appearance intact;moist drainage   Closure None   Base moist;pink   Red (%), Wound Tissue Color 100   Periwound intact;pink   Periwound Temperature warm   Periwound Skin Turgor soft   Edges open   Wound Length (cm) 0.7 cm  (medial wound 0.8cm)   Wound Width (cm) 0.9 cm  (medial wound 1.2cm)   Wound Depth (cm) 0.1 cm  (medial wound 0.1cm)   Wound Surface  Area (cm^2) 0.63 cm^2   Wound Volume (cm^3) 0.063 cm^3   Drainage Characteristics/Odor serous   Drainage Amount small   Care, Wound cleansed with;irrigated with;sterile normal saline   Dressing Care dressing applied;calcium alginate;transparent film   Periwound Care absorptive dressing applied          Wound Check / Follow-up:  Patient seen today for wound check and wound follow-up. Patient presents with intact dressing. Patient states she continues to have irritation and traumatic injuries from tape and dressings.   Dressing removed gently. Traumatic injury to far right outer lateral axilla noted with minor oozing, that is controlled with gauze and NS.   Patient continues to have two small areas of open tissue. These are the areas, the patient previously reported as traumatic injuries from adhesive. Center wound previously noted has closed. Wound bases are shallow with pink to red moist tissue noted along base. Cleansed with NS and gauze. Recommending to continue current wound care with silver impregnated calcium alginate but to secure with transparent drape to see if that is less harsh on her skin. Recommending patient return again next week to ensure complete closure of site.     Impression: Surgical wound to right axilla with closure by secondary intent. Traumatic injuries from adhesive    Short term goals:  Regain skin integrity. Dressing changes every other day prn.     Cynthia Raya RN    4/17/2024    14:34 EDT

## 2024-04-24 ENCOUNTER — HOSPITAL ENCOUNTER (OUTPATIENT)
Dept: INFUSION THERAPY | Facility: HOSPITAL | Age: 29
Discharge: HOME | End: 2024-04-24
Admitting: NURSE PRACTITIONER
Payer: COMMERCIAL

## 2024-04-24 VITALS
OXYGEN SATURATION: 100 % | TEMPERATURE: 98.4 F | RESPIRATION RATE: 20 BRPM | DIASTOLIC BLOOD PRESSURE: 66 MMHG | HEART RATE: 94 BPM | SYSTOLIC BLOOD PRESSURE: 126 MMHG

## 2024-04-24 DIAGNOSIS — L73.2 HIDRADENITIS SUPPURATIVA: Primary | ICD-10-CM

## 2024-04-24 PROCEDURE — G0463 HOSPITAL OUTPT CLINIC VISIT: HCPCS

## 2024-04-24 NOTE — SIGNIFICANT NOTE
Wound Eval / Progress Noted     [facility]     Patient Name: Chetna Woody  : 1995  MRN: [id number]  Today's Date: 2024                 Admit Date: 2024    Visit Dx:    ICD-10-CM ICD-9-CM   1. Hidradenitis suppurativa  L73.2 705.83         * No active hospital problems. *        Past Medical History:   Diagnosis Date    Amenorrhea     Anemia     ASYMPTOMATIC    Boils     Congenital meatal stenosis     Constipation     GERD (gastroesophageal reflux disease)     Glands swollen     Headache     Heart burn     Hidradenitis     Hidradenitis     Hidradenitis suppurativa     IC (interstitial cystitis)     Insulin resistance 2018    Psoriasis         Past Surgical History:   Procedure Laterality Date    ABLATION COLPOCLESIS      ADENOIDECTOMY      AXILLARY NODE DISSECTION Right 2024    Procedure: AXILLARY HIDRADENITIS EXCISION;  Surgeon: Mart Frederick MD;  [facility]: [facility] [place];  Service: General;  Laterality: Right;    CERVIX SURGERY      cyst and scar tissue remover right cervical neck- LYMPH NODES REMOVED DELFIN     SECTION      baby girl    CYSTOSCOPY      D & C HYSTEROSCOPY ENDOMETRIAL ABLATION      LASER ABLATION  2021    Uterine    SALPINGECTOMY  2021    TONSILLECTOMY      TUBAL ABDOMINAL LIGATION      URETHRAL DILATATION      WISDOM TOOTH EXTRACTION  2017         Physical Assessment:  Wound Right mid axillary Incision (Active)   Wound Image   24 0920   Dressing Appearance dry;intact;no drainage 24 0920   Closure None 24 0920   Base dry;pink;epithelialization;closed/resurfaced 24 0920   Periwound dry;intact;pink 24 0920   Periwound Temperature warm 24 0920   Periwound Skin Turgor soft 24 0920   Edges rolled/closed 24 0920   Drainage Amount none 24 0920   Care, Wound cleansed with;sterile normal saline 24 0920   Dressing Care open to air 24 0920      Wound Check / Follow-up: Patient  seen today for wound follow-up and dressing change in outpatient nursing services.  Patient presents with dry and intact dressing to right axilla.  No drainage is noted.  Remove dressing.  Wounds to right axilla are now closed.  Dry, pink, intact tissue present to right axilla.  No open tissue or drainage noted.  Cleansed area with normal saline and gauze.  Instructed patient that she is to return to outpatient nursing services on an as-needed basis.  Instructed her to call with any concerns.  Patient verbalized understanding.      Impression: Surgical wound to right axilla with complete closure.      Short term goals: Maintain skin integrity, skin protection.    Jojo Buckner RN    4/24/2024    09:46 EDT

## 2024-07-03 ENCOUNTER — OFFICE VISIT (OUTPATIENT)
Dept: FAMILY MEDICINE CLINIC | Facility: CLINIC | Age: 29
End: 2024-07-03
Payer: COMMERCIAL

## 2024-07-03 VITALS
HEIGHT: 65 IN | OXYGEN SATURATION: 100 % | BODY MASS INDEX: 36.65 KG/M2 | WEIGHT: 220 LBS | TEMPERATURE: 97.8 F | HEART RATE: 94 BPM | DIASTOLIC BLOOD PRESSURE: 60 MMHG | SYSTOLIC BLOOD PRESSURE: 123 MMHG

## 2024-07-03 DIAGNOSIS — K04.7 DENTAL ABSCESS: ICD-10-CM

## 2024-07-03 DIAGNOSIS — Z01.419 WELL WOMAN EXAM WITH ROUTINE GYNECOLOGICAL EXAM: Primary | ICD-10-CM

## 2024-07-03 DIAGNOSIS — L73.2 HIDRADENITIS SUPPURATIVA: ICD-10-CM

## 2024-07-03 DIAGNOSIS — Z00.00 ANNUAL PHYSICAL EXAM: ICD-10-CM

## 2024-07-03 LAB
25(OH)D3 SERPL-MCNC: 29.8 NG/ML (ref 30–100)
ALBUMIN SERPL-MCNC: 4.3 G/DL (ref 3.5–5.2)
ALBUMIN/GLOB SERPL: 1.5 G/DL
ALP SERPL-CCNC: 66 U/L (ref 39–117)
ALT SERPL W P-5'-P-CCNC: 33 U/L (ref 1–33)
ANION GAP SERPL CALCULATED.3IONS-SCNC: 11 MMOL/L (ref 5–15)
AST SERPL-CCNC: 20 U/L (ref 1–32)
BASOPHILS # BLD AUTO: 0.01 10*3/MM3 (ref 0–0.2)
BASOPHILS NFR BLD AUTO: 0.1 % (ref 0–1.5)
BILIRUB BLD-MCNC: NEGATIVE MG/DL
BILIRUB SERPL-MCNC: 0.2 MG/DL (ref 0–1.2)
BUN SERPL-MCNC: 10 MG/DL (ref 6–20)
BUN/CREAT SERPL: 11.1 (ref 7–25)
C TRACH RRNA CVX QL NAA+PROBE: NOT DETECTED
CALCIUM SPEC-SCNC: 9.7 MG/DL (ref 8.6–10.5)
CANDIDA SPECIES: POSITIVE
CHLORIDE SERPL-SCNC: 105 MMOL/L (ref 98–107)
CHOLEST SERPL-MCNC: 152 MG/DL (ref 0–200)
CLARITY, POC: CLEAR
CO2 SERPL-SCNC: 23 MMOL/L (ref 22–29)
COLOR UR: YELLOW
CREAT SERPL-MCNC: 0.9 MG/DL (ref 0.57–1)
DEPRECATED RDW RBC AUTO: 40.8 FL (ref 37–54)
EGFRCR SERPLBLD CKD-EPI 2021: 89.5 ML/MIN/1.73
EOSINOPHIL # BLD AUTO: 0.2 10*3/MM3 (ref 0–0.4)
EOSINOPHIL NFR BLD AUTO: 2.5 % (ref 0.3–6.2)
ERYTHROCYTE [DISTWIDTH] IN BLOOD BY AUTOMATED COUNT: 12.1 % (ref 12.3–15.4)
EXPIRATION DATE: NORMAL
FOLATE SERPL-MCNC: 6.54 NG/ML (ref 4.78–24.2)
GARDNERELLA VAGINALIS: POSITIVE
GLOBULIN UR ELPH-MCNC: 2.9 GM/DL
GLUCOSE SERPL-MCNC: 80 MG/DL (ref 65–99)
GLUCOSE UR STRIP-MCNC: NEGATIVE MG/DL
HCT VFR BLD AUTO: 37.8 % (ref 34–46.6)
HDLC SERPL-MCNC: 42 MG/DL (ref 40–60)
HGB BLD-MCNC: 12.9 G/DL (ref 12–15.9)
IMM GRANULOCYTES # BLD AUTO: 0.04 10*3/MM3 (ref 0–0.05)
IMM GRANULOCYTES NFR BLD AUTO: 0.5 % (ref 0–0.5)
IRON 24H UR-MRATE: 76 MCG/DL (ref 37–145)
IRON SATN MFR SERPL: 24 % (ref 20–50)
KETONES UR QL: NEGATIVE
LDLC SERPL CALC-MCNC: 92 MG/DL (ref 0–100)
LDLC/HDLC SERPL: 2.15 {RATIO}
LEUKOCYTE EST, POC: NEGATIVE
LYMPHOCYTES # BLD AUTO: 1.49 10*3/MM3 (ref 0.7–3.1)
LYMPHOCYTES NFR BLD AUTO: 18.3 % (ref 19.6–45.3)
Lab: NORMAL
MCH RBC QN AUTO: 31.5 PG (ref 26.6–33)
MCHC RBC AUTO-ENTMCNC: 34.1 G/DL (ref 31.5–35.7)
MCV RBC AUTO: 92.4 FL (ref 79–97)
MONOCYTES # BLD AUTO: 0.57 10*3/MM3 (ref 0.1–0.9)
MONOCYTES NFR BLD AUTO: 7 % (ref 5–12)
N GONORRHOEA RRNA SPEC QL NAA+PROBE: NOT DETECTED
NEUTROPHILS NFR BLD AUTO: 5.83 10*3/MM3 (ref 1.7–7)
NEUTROPHILS NFR BLD AUTO: 71.6 % (ref 42.7–76)
NITRITE UR-MCNC: NEGATIVE MG/ML
NRBC BLD AUTO-RTO: 0 /100 WBC (ref 0–0.2)
PH UR: 6 [PH] (ref 5–8)
PLATELET # BLD AUTO: 270 10*3/MM3 (ref 140–450)
PMV BLD AUTO: 10.3 FL (ref 6–12)
POTASSIUM SERPL-SCNC: 4.3 MMOL/L (ref 3.5–5.2)
PROT SERPL-MCNC: 7.2 G/DL (ref 6–8.5)
PROT UR STRIP-MCNC: NEGATIVE MG/DL
RBC # BLD AUTO: 4.09 10*6/MM3 (ref 3.77–5.28)
RBC # UR STRIP: NEGATIVE /UL
SODIUM SERPL-SCNC: 139 MMOL/L (ref 136–145)
SP GR UR: 1.02 (ref 1–1.03)
T VAGINALIS DNA VAG QL PROBE+SIG AMP: NEGATIVE
T3FREE SERPL-MCNC: 3.35 PG/ML (ref 2–4.4)
T4 FREE SERPL-MCNC: 1.21 NG/DL (ref 0.92–1.68)
TIBC SERPL-MCNC: 323 MCG/DL (ref 298–536)
TRANSFERRIN SERPL-MCNC: 217 MG/DL (ref 200–360)
TRIGL SERPL-MCNC: 98 MG/DL (ref 0–150)
TSH SERPL DL<=0.05 MIU/L-ACNC: 0.94 UIU/ML (ref 0.27–4.2)
UROBILINOGEN UR QL: NORMAL
VIT B12 BLD-MCNC: 241 PG/ML (ref 211–946)
VLDLC SERPL-MCNC: 18 MG/DL (ref 5–40)
WBC NRBC COR # BLD AUTO: 8.14 10*3/MM3 (ref 3.4–10.8)

## 2024-07-03 PROCEDURE — 84466 ASSAY OF TRANSFERRIN: CPT | Performed by: NURSE PRACTITIONER

## 2024-07-03 PROCEDURE — 83540 ASSAY OF IRON: CPT | Performed by: NURSE PRACTITIONER

## 2024-07-03 PROCEDURE — 82306 VITAMIN D 25 HYDROXY: CPT | Performed by: NURSE PRACTITIONER

## 2024-07-03 PROCEDURE — 87591 N.GONORRHOEAE DNA AMP PROB: CPT | Performed by: NURSE PRACTITIONER

## 2024-07-03 PROCEDURE — 2014F MENTAL STATUS ASSESS: CPT | Performed by: NURSE PRACTITIONER

## 2024-07-03 PROCEDURE — 99395 PREV VISIT EST AGE 18-39: CPT | Performed by: NURSE PRACTITIONER

## 2024-07-03 PROCEDURE — 82746 ASSAY OF FOLIC ACID SERUM: CPT | Performed by: NURSE PRACTITIONER

## 2024-07-03 PROCEDURE — 85025 COMPLETE CBC W/AUTO DIFF WBC: CPT | Performed by: NURSE PRACTITIONER

## 2024-07-03 PROCEDURE — 82607 VITAMIN B-12: CPT | Performed by: NURSE PRACTITIONER

## 2024-07-03 PROCEDURE — 1125F AMNT PAIN NOTED PAIN PRSNT: CPT | Performed by: NURSE PRACTITIONER

## 2024-07-03 PROCEDURE — 87660 TRICHOMONAS VAGIN DIR PROBE: CPT | Performed by: NURSE PRACTITIONER

## 2024-07-03 PROCEDURE — 84439 ASSAY OF FREE THYROXINE: CPT | Performed by: NURSE PRACTITIONER

## 2024-07-03 PROCEDURE — 80061 LIPID PANEL: CPT | Performed by: NURSE PRACTITIONER

## 2024-07-03 PROCEDURE — 80053 COMPREHEN METABOLIC PANEL: CPT | Performed by: NURSE PRACTITIONER

## 2024-07-03 PROCEDURE — 84481 FREE ASSAY (FT-3): CPT | Performed by: NURSE PRACTITIONER

## 2024-07-03 PROCEDURE — 87480 CANDIDA DNA DIR PROBE: CPT | Performed by: NURSE PRACTITIONER

## 2024-07-03 PROCEDURE — 87491 CHLMYD TRACH DNA AMP PROBE: CPT | Performed by: NURSE PRACTITIONER

## 2024-07-03 PROCEDURE — 84443 ASSAY THYROID STIM HORMONE: CPT | Performed by: NURSE PRACTITIONER

## 2024-07-03 PROCEDURE — G0123 SCREEN CERV/VAG THIN LAYER: HCPCS | Performed by: NURSE PRACTITIONER

## 2024-07-03 PROCEDURE — 81003 URINALYSIS AUTO W/O SCOPE: CPT | Performed by: NURSE PRACTITIONER

## 2024-07-03 PROCEDURE — 87510 GARDNER VAG DNA DIR PROBE: CPT | Performed by: NURSE PRACTITIONER

## 2024-07-03 PROCEDURE — 1159F MED LIST DOCD IN RCRD: CPT | Performed by: NURSE PRACTITIONER

## 2024-07-03 PROCEDURE — 1160F RVW MEDS BY RX/DR IN RCRD: CPT | Performed by: NURSE PRACTITIONER

## 2024-07-03 RX ORDER — DOXYCYCLINE HYCLATE 100 MG/1
100 CAPSULE ORAL 2 TIMES DAILY
Qty: 20 CAPSULE | Refills: 0 | Status: SHIPPED | OUTPATIENT
Start: 2024-07-03

## 2024-07-03 NOTE — PROGRESS NOTES
"Subjective     Chief Complaint   Patient presents with    Gynecologic Exam       Chetna Woody is a 28 y.o. female who presents for an annual exam. The patient has no complaints today. The patient is sexually active. GYN screening history: last pap: was normal. The patient wears seatbelts: yes. The patient participates in regular exercise: no.  The patient reports that there is not domestic violence in her life.     Menstrual History:  OB History          1    Para   1    Term   1       0    AB   0    Living   0         SAB   0    IAB   0    Ectopic   0    Molar   0    Multiple   0    Live Births   0               Menarche age: young girl  No LMP recorded. (Menstrual status: Tubal ligation). She had an ablation  She is  and he does not want any kids.  She is having left upper dental issues.  She cannot find the dentist that accepts her insurance currently.  She needs some antibiotic.  She feels that she started to have said something going on for about 3 weeks.  She is wanting fasting labs today too         The following portions of the patient's history were reviewed and updated as appropriate:vital signs, allergies, current medications, past family history, past medical history, past social history, past surgical history, and problem list    Review of Systems   A comprehensive review of systems was negative.     Objective     /60 (BP Location: Left arm, Patient Position: Sitting, Cuff Size: Adult)   Pulse 94   Temp 97.8 °F (36.6 °C) (Temporal)   Ht 165.1 cm (65\")   Wt 99.8 kg (220 lb)   SpO2 100%   BMI 36.61 kg/m²       Physical Exam    General- NAD, alert and oriented, appropriate  Psych- Normal mood, good memory  Neck- No masses, no thyroid enlargement  CV- Regular rhythm, no murnurs  Resp- CTA to bases, no wheezes  Abdomen- Soft, nondistended, nontender, no masses    Breast left-  Bilaterally symmetrical, no masses, nontender, no nipple discharge  Breast right- Bilaterally " symmetrical, no masses, nontender, no nipple discharge    External genitalia- Normal female, no lesions  Urethra/meatus- Normal, no masses, nontender  Bladder- Normal, no masses, nontender  Vagina- Normal, no atrophy, no lesions, yellow/thick discharge.  PROLAPSE : none noted, not examined with split speculum to delineate  Cvx- Normal, no lesions, no discharge, No cervical motion tenderness  Uterus- Normal size, shape & consistency.  Non tender, mobile.  Adnexa- No mass, non tender  Anus/Rectum/Perineum- Normal appearance, no mass, good sphincter tone, no hemorrhoids, no prolapse    Lymphatic- No palpable neck, axillary, or groin nodes  Ext- No edema, no cyanosis    Skin- No lesions, no rashes, no acanthosis nigricans--she does have several boils noted from her condition.  She has a scar under the right axillary but no redness noted.  MADHU Newton MA in room for exam          Lab Review   Labs: urine normal     Imaging   No data reviewed    Last Completed Mammogram       This patient has no relevant Health Maintenance data.           Last Completed Colonoscopy       This patient has no relevant Health Maintenance data.           Last Completed Pap Smear       This patient has no relevant Health Maintenance data.           .       Diagnoses and all orders for this visit:    1. Well woman exam with routine gynecological exam (Primary)  -     IGP,rfx Aptima HPV All Pth  -     Gardnerella vaginalis, Trichomonas vaginalis, Candida albicans, DNA - Swab, Vagina  -     Chlamydia trachomatis, Neisseria gonorrhoeae, PCR - , Cervix  -     POC Urinalysis Dipstick, Automated    2. Dental abscess  -     doxycycline (VIBRAMYCIN) 100 MG capsule; Take 1 capsule by mouth 2 (Two) Times a Day.  Dispense: 20 capsule; Refill: 0    3. Annual physical exam  -     Comprehensive Metabolic Panel  -     CBC & Differential  -     TSH  -     Lipid Panel  -     T3, Free  -     T4, Free  -     Vitamin D,25-Hydroxy  -     Vitamin B12 & Folate  -      Iron Profile    4. Hidradenitis suppurativa           She understands the importance of having any ordered tests to be performed in a timely fashion.  The risks of not performing them include, but are not limited to, advanced cancer stages, bone loss from osteoporosis and/or subsequent increase in morbidity and/or mortality.  She is encouraged to review her results online and/or contact our office if she has questions.   Follow up: 1 year(s)    Pallavi Koch, PHYLICIA  07/03/2024

## 2024-07-05 ENCOUNTER — PATIENT ROUNDING (BHMG ONLY) (OUTPATIENT)
Dept: FAMILY MEDICINE CLINIC | Facility: CLINIC | Age: 29
End: 2024-07-05
Payer: COMMERCIAL

## 2024-07-05 RX ORDER — FLUCONAZOLE 150 MG/1
150 TABLET ORAL DAILY
Qty: 2 TABLET | Refills: 0 | Status: SHIPPED | OUTPATIENT
Start: 2024-07-05

## 2024-07-05 RX ORDER — METRONIDAZOLE 500 MG/1
500 TABLET ORAL 2 TIMES DAILY
Qty: 14 TABLET | Refills: 0 | Status: SHIPPED | OUTPATIENT
Start: 2024-07-05

## 2024-07-08 LAB
CONV .: NORMAL
CYTOLOGIST CVX/VAG CYTO: NORMAL
CYTOLOGY CVX/VAG DOC CYTO: NORMAL
CYTOLOGY CVX/VAG DOC THIN PREP: NORMAL
DX ICD CODE: NORMAL
Lab: NORMAL
OTHER STN SPEC: NORMAL
STAT OF ADQ CVX/VAG CYTO-IMP: NORMAL

## 2024-08-27 ENCOUNTER — OFFICE VISIT (OUTPATIENT)
Dept: FAMILY MEDICINE CLINIC | Facility: CLINIC | Age: 29
End: 2024-08-27
Payer: COMMERCIAL

## 2024-08-27 VITALS
DIASTOLIC BLOOD PRESSURE: 80 MMHG | SYSTOLIC BLOOD PRESSURE: 130 MMHG | HEART RATE: 99 BPM | TEMPERATURE: 98.3 F | OXYGEN SATURATION: 98 % | HEIGHT: 65 IN | BODY MASS INDEX: 36.79 KG/M2 | WEIGHT: 220.8 LBS | RESPIRATION RATE: 18 BRPM

## 2024-08-27 DIAGNOSIS — F33.0 MILD EPISODE OF RECURRENT MAJOR DEPRESSIVE DISORDER: ICD-10-CM

## 2024-08-27 DIAGNOSIS — F41.9 ANXIETY: Primary | ICD-10-CM

## 2024-08-27 PROCEDURE — 1160F RVW MEDS BY RX/DR IN RCRD: CPT | Performed by: NURSE PRACTITIONER

## 2024-08-27 PROCEDURE — 1125F AMNT PAIN NOTED PAIN PRSNT: CPT | Performed by: NURSE PRACTITIONER

## 2024-08-27 PROCEDURE — 1159F MED LIST DOCD IN RCRD: CPT | Performed by: NURSE PRACTITIONER

## 2024-08-27 PROCEDURE — 99213 OFFICE O/P EST LOW 20 MIN: CPT | Performed by: NURSE PRACTITIONER

## 2024-08-27 RX ORDER — SECUKINUMAB 300 MG/2ML
300 INJECTION SUBCUTANEOUS WEEKLY
COMMUNITY
Start: 2024-08-01

## 2024-08-28 NOTE — PROGRESS NOTES
"Chief Complaint  Anxiety and Depression    Subjective          Chetna Woody presents to [facility]  Anxiety   Symptoms include depressed mood and nervous/anxious behavior.  Patient reports no suicidal ideas. Her past medical history is significant for depression.   DepressionSymptoms include depressed mood and nervousness/anxiety. Patient is not experiencing: suicidal ideas.  Her past medical history is significant for depression.     She is going through a divorce and her and her daughter have moved out and have her own place.  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  She said that she is just had a lot of increased anxiety over the last 4 weeks.  She said that Zoloft and Lexapro have pretty much \"feels in different\" when she has taken it in the past.  She is going to see psychiatry and also do a [employer] test  Depression: Not at risk (2024)    PHQ-2     PHQ-2 Score: 0    and 2024               Allergies  Erythromycin and Sulfamethoxazole-trimethoprim    Social History     Tobacco Use    Smoking status: Former     Current packs/day: 0.00     Average packs/day: 1 pack/day for 13.0 years (13.0 ttl pk-yrs)     Types: Cigarettes     Start date:      Quit date:      Years since quittin.6    Smokeless tobacco: Never    Tobacco comments:     INSTRUCTED NO SMOKING 24 HR PRIOR TO SURGERY PER ANESTHESIA ORDERS   Vaping Use    Vaping status: Every Day    Substances: Nicotine (2%), INSTRUCTED NO VAPING 24 HR PRIOR TO SURGERY PER ANESTHESIA ORDERS    Devices: Disposable   Substance Use Topics    Alcohol use: Yes     Comment: Social     Drug use: Never       Family History   Problem Relation Age of Onset    Anxiety disorder Mother     Hypothyroidism Mother     Heart disease Mother     Other Father         Renal Calculus    Anxiety disorder Sister     Hypothyroidism Sister     Hypothyroidism Maternal Grandmother     Diabetes type II " "Paternal Grandmother     Diabetes type II Paternal Grandfather     Hyperlipidemia Paternal Grandfather     Hypertension Paternal Grandfather     Hypothyroidism Other         Health Maintenance Due   Topic Date Due    INFLUENZA VACCINE  08/01/2024        Immunization History   Administered Date(s) Administered    COVID-19 (MODERNA) 1st,2nd,3rd Dose Monovalent 04/30/2021, 05/28/2021    DTP / HiB 01/29/1996    DTaP, Unspecified 07/29/1999    Fluzone (or Fluarix & Flulaval for VFC) >6mos 11/11/2014    Hep A, 2 Dose 02/13/2007    Hep B, Adolescent or Pediatric 08/13/1996    Influenza TIV (IM) 11/11/2014    MMR 07/29/1999, 08/30/1999    Meningococcal Conjugate 02/13/2007    OPV 01/29/1996, 07/29/1999    PEDS-Pneumococcal Conjugate (PCV7) 11/11/2014    Pneumococcal Conjugate 13-Valent (PCV13) 11/11/2014    Tdap 12/27/2021    Varicella 05/18/2001, 02/13/2007       Review of Systems   Psychiatric/Behavioral:  Positive for depressed mood and stress. Negative for suicidal ideas. The patient is nervous/anxious.         Objective       Vitals:    08/27/24 1543 08/27/24 1549   BP: 141/75 130/80   Pulse: 99    Resp: 18    Temp: 98.3 °F (36.8 °C)    SpO2: 98%    Weight: 100 kg (220 lb 12.8 oz)    Height: 165.1 cm (65\")        Body mass index is 36.74 kg/m².         Physical Exam  Constitutional:       Appearance: Normal appearance.   HENT:      Head: Normocephalic.   Pulmonary:      Effort: Pulmonary effort is normal.   Skin:     Findings: No bruising.   Neurological:      General: No focal deficit present.      Mental Status: She is alert and oriented to person, place, and time.   Psychiatric:         Mood and Affect: Mood normal.         Behavior: Behavior normal.         Thought Content: Thought content normal.         Judgment: Judgment normal.               Result Review :     The following data was reviewed by: PHYLICIA Pat on 08/27/2024:            No Images in the past 120 days found..              Assessment " and Plan      Assessment & Plan  Anxiety    Mild episode of recurrent major depressive disorder      Orders Placed This Encounter   Procedures    GeneSight - Swab,    Ambulatory Referral to Psychiatry             Diagnosis Plan   1. Anxiety  GeneSight - Swab,    Ambulatory Referral to Psychiatry      2. Mild episode of recurrent major depressive disorder  GeneSight - Swab,    Ambulatory Referral to Psychiatry                Follow Up     Return if symptoms worsen or fail to improve.  We will do GeneSight and then go from there.  We may even do some buspirone but I want to see what her test show first.  If she can get into psychiatry first we will allow him to make the decision with the patient on the best choice of medication for patient.  Patient was given instructions and counseling regarding her condition or for health maintenance advice. Please see specific information pulled into the AVS if appropriate.     Parts of this note are electronic transcriptions/translations of spoken language to printed text using the Dragon Dictation system.          Pallavi Koch, APRN  08/27/2024

## 2024-11-27 PROCEDURE — 87070 CULTURE OTHR SPECIMN AEROBIC: CPT | Performed by: FAMILY MEDICINE

## 2024-11-27 PROCEDURE — 87205 SMEAR GRAM STAIN: CPT | Performed by: FAMILY MEDICINE

## 2024-12-02 ENCOUNTER — TELEPHONE (OUTPATIENT)
Dept: URGENT CARE | Facility: CLINIC | Age: 29
End: 2024-12-02
Payer: COMMERCIAL

## 2024-12-09 ENCOUNTER — OFFICE VISIT (OUTPATIENT)
Dept: SURGERY | Facility: CLINIC | Age: 29
End: 2024-12-09
Payer: COMMERCIAL

## 2024-12-09 VITALS — BODY MASS INDEX: 35.65 KG/M2 | RESPIRATION RATE: 14 BRPM | WEIGHT: 214 LBS | HEIGHT: 65 IN

## 2024-12-09 DIAGNOSIS — L73.2 HIDRADENITIS SUPPURATIVA: Primary | ICD-10-CM

## 2024-12-09 PROCEDURE — 1159F MED LIST DOCD IN RCRD: CPT | Performed by: SURGERY

## 2024-12-09 PROCEDURE — 1160F RVW MEDS BY RX/DR IN RCRD: CPT | Performed by: SURGERY

## 2024-12-09 PROCEDURE — 99213 OFFICE O/P EST LOW 20 MIN: CPT | Performed by: SURGERY

## 2024-12-09 RX ORDER — ONDANSETRON 2 MG/ML
4 INJECTION INTRAMUSCULAR; INTRAVENOUS EVERY 6 HOURS PRN
OUTPATIENT
Start: 2024-12-09

## 2024-12-09 RX ORDER — SODIUM CHLORIDE 9 MG/ML
40 INJECTION, SOLUTION INTRAVENOUS AS NEEDED
OUTPATIENT
Start: 2024-12-09

## 2024-12-09 RX ORDER — DOXYCYCLINE HYCLATE 100 MG
TABLET ORAL
COMMUNITY
Start: 2024-11-27

## 2024-12-09 RX ORDER — SODIUM CHLORIDE 0.9 % (FLUSH) 0.9 %
10 SYRINGE (ML) INJECTION EVERY 12 HOURS SCHEDULED
OUTPATIENT
Start: 2024-12-09

## 2024-12-09 RX ORDER — SODIUM CHLORIDE 0.9 % (FLUSH) 0.9 %
10 SYRINGE (ML) INJECTION AS NEEDED
OUTPATIENT
Start: 2024-12-09

## 2024-12-09 NOTE — PROGRESS NOTES
Inpatient History and Physical Surgical Orders    Preadmission Location:   Preadmission Time:  Facility:  Surgery Date:  Surgery Time:  Preadmission Test date:     Chief Complaint  Outpatient History and Physical / Surgical Orders    Primary Care Provider: Pallavi Koch APRN    Referring Provider: No ref. provider found    Subjective      Patient Name: Chetna Woody : 1995    HPI  The patient is a 29-year-old female that we have taken care of in the past.  She had a right axillary skin excision done for extensive hidradenitis.  She had been doing well until about 2 weeks ago and she had a recurrent ulcer formed just above the top of that old incision.  She has been on some oral antibiotics and has also been using some Bactroban but has gotten a little bit larger.    Past History:  Medical History: has a past medical history of Amenorrhea, Anemia, Boils, Congenital meatal stenosis, Constipation, GERD (gastroesophageal reflux disease), Glands swollen, Headache, Heart burn, Hidradenitis, Hidradenitis, Hidradenitis suppurativa, IC (interstitial cystitis), Insulin resistance (2018), and Psoriasis.   Surgical History: has a past surgical history that includes Adenoidectomy; Cervix surgery ();  section (); Cystoscopy; Salpingectomy (2021); Tonsillectomy (); Urethral Dilatation; Laser ablation (2021); [place] tooth extraction (2017); Tubal ligation; d & c hysteroscopy endometrial ablation; Ablation colpoclesis; and Axillary node dissection (Right, 2024).   Family History: family history includes Anxiety disorder in her mother and sister; Diabetes type II in her paternal grandfather and paternal grandmother; Heart disease in her mother; Hyperlipidemia in her paternal grandfather; Hypertension in her paternal grandfather; Hypothyroidism in her maternal grandmother, mother, sister, and another family member; Other in her father.   Social History: reports that she  "quit smoking about 23 months ago. Her smoking use included cigarettes. She started smoking about 14 years ago. She has a 13 pack-year smoking history. She has been exposed to tobacco smoke. She has never used smokeless tobacco. She reports current alcohol use. She reports that she does not use drugs.  Allergies: Erythromycin and Sulfamethoxazole-trimethoprim       Current Outpatient Medications:     benzonatate (TESSALON) 200 MG capsule, Take 1 capsule by mouth 3 (Three) Times a Day As Needed for Cough., Disp: 30 capsule, Rfl: 0    Cholecalciferol 25 MCG (1000 UT) tablet, Take 1 tablet by mouth Daily., Disp: , Rfl:     Cosentyx UnoReady 300 MG/2ML solution auto-injector, Inject 300 mg under the skin into the appropriate area as directed 1 (One) Time Per Week., Disp: , Rfl:     doxycycline (VIBRAMYICN) 100 MG tablet, , Disp: , Rfl:     lamoTRIgine (LaMICtal) 25 MG tablet, Take 1 tablet by mouth Daily., Disp: , Rfl:     mupirocin (BACTROBAN) 2 % ointment, Apply 1 Application topically to the appropriate area as directed 3 (Three) Times a Day., Disp: 30 g, Rfl: 0    pseudoephedrine (SudoGest) 30 MG tablet, Take 1 tablet by mouth Every 6 (Six) Hours As Needed for Congestion., Disp: 30 tablet, Rfl: 0    vitamin B-12 (CYANOCOBALAMIN) 100 MCG tablet, Take 0.5 tablets by mouth Daily., Disp: , Rfl:        Objective   Vital Signs:   Resp 14   Ht 165.1 cm (65\")   Wt 97.1 kg (214 lb)   BMI 35.61 kg/m²       Physical Exam  Vitals and nursing note reviewed.   Constitutional:       Appearance: Normal appearance. The patient is well-developed.   Cardiovascular:      Rate and Rhythm: Normal rate and regular rhythm.   Pulmonary:      Effort: Pulmonary effort is normal.      Breath sounds: Normal air entry.   Abdominal:      General: Bowel sounds are normal.      Palpations: Abdomen is soft.      Skin:     General: Skin is warm and dry.   Neurological:      Mental Status: The patient is alert and oriented to person, place, and " time.      Motor: Motor function is intact.   Psychiatric:         Mood and Affect: Mood normal.   Axilla: She has a scar in the right axilla with an ulcer just above the scar.    Result Review :               Assessment and Plan   Diagnoses and all orders for this visit:    1. Hidradenitis suppurativa (Primary)  -     Case Request; Standing  -     Follow Anesthesia Guidelines / Protocol; Standing  -     Verify NPO Status; Standing  -     Verify / Perform Chlorhexidine Skin Prep; Standing  -     Insert Peripheral IV; Standing  -     Saline Lock & Maintain IV Access; Standing  -     sodium chloride 0.9 % flush 10 mL  -     sodium chloride 0.9 % flush 10 mL  -     sodium chloride 0.9 % infusion 40 mL  -     Place Sequential Compression Device; Standing  -     Maintain Sequential Compression Device; Standing  -     ondansetron (ZOFRAN) injection 4 mg  -     ceFAZolin (ANCEF) 2,000 mg in sodium chloride 0.9 % 100 mL IVPB    We will schedule her for reexcision of this ulcer secondary to hidradenitis.  I told her I think it would be better if we could get back to healthier subcutaneous fat and hopefully we can get this to heal in a little better.  I have described the procedure to her as well as the risk and benefits and she is agreeable to proceeding.    I  Mart Frederick MD  12/09/2024

## 2024-12-09 NOTE — H&P (VIEW-ONLY)
Inpatient History and Physical Surgical Orders    Preadmission Location:   Preadmission Time:  Facility:  Surgery Date:  Surgery Time:  Preadmission Test date:     Chief Complaint  Outpatient History and Physical / Surgical Orders    Primary Care Provider: Pallavi Koch APRN    Referring Provider: No ref. provider found    Subjective      Patient Name: Chetna Woody : 1995    HPI  The patient is a 29-year-old female that we have taken care of in the past.  She had a right axillary skin excision done for extensive hidradenitis.  She had been doing well until about 2 weeks ago and she had a recurrent ulcer formed just above the top of that old incision.  She has been on some oral antibiotics and has also been using some Bactroban but has gotten a little bit larger.    Past History:  Medical History: has a past medical history of Amenorrhea, Anemia, Boils, Congenital meatal stenosis, Constipation, GERD (gastroesophageal reflux disease), Glands swollen, Headache, Heart burn, Hidradenitis, Hidradenitis, Hidradenitis suppurativa, IC (interstitial cystitis), Insulin resistance (2018), and Psoriasis.   Surgical History: has a past surgical history that includes Adenoidectomy; Cervix surgery ();  section (); Cystoscopy; Salpingectomy (2021); Tonsillectomy (); Urethral Dilatation; Laser ablation (2021); [place] tooth extraction (2017); Tubal ligation; d & c hysteroscopy endometrial ablation; Ablation colpoclesis; and Axillary node dissection (Right, 2024).   Family History: family history includes Anxiety disorder in her mother and sister; Diabetes type II in her paternal grandfather and paternal grandmother; Heart disease in her mother; Hyperlipidemia in her paternal grandfather; Hypertension in her paternal grandfather; Hypothyroidism in her maternal grandmother, mother, sister, and another family member; Other in her father.   Social History: reports that she  "quit smoking about 23 months ago. Her smoking use included cigarettes. She started smoking about 14 years ago. She has a 13 pack-year smoking history. She has been exposed to tobacco smoke. She has never used smokeless tobacco. She reports current alcohol use. She reports that she does not use drugs.  Allergies: Erythromycin and Sulfamethoxazole-trimethoprim       Current Outpatient Medications:     benzonatate (TESSALON) 200 MG capsule, Take 1 capsule by mouth 3 (Three) Times a Day As Needed for Cough., Disp: 30 capsule, Rfl: 0    Cholecalciferol 25 MCG (1000 UT) tablet, Take 1 tablet by mouth Daily., Disp: , Rfl:     Cosentyx UnoReady 300 MG/2ML solution auto-injector, Inject 300 mg under the skin into the appropriate area as directed 1 (One) Time Per Week., Disp: , Rfl:     doxycycline (VIBRAMYICN) 100 MG tablet, , Disp: , Rfl:     lamoTRIgine (LaMICtal) 25 MG tablet, Take 1 tablet by mouth Daily., Disp: , Rfl:     mupirocin (BACTROBAN) 2 % ointment, Apply 1 Application topically to the appropriate area as directed 3 (Three) Times a Day., Disp: 30 g, Rfl: 0    pseudoephedrine (SudoGest) 30 MG tablet, Take 1 tablet by mouth Every 6 (Six) Hours As Needed for Congestion., Disp: 30 tablet, Rfl: 0    vitamin B-12 (CYANOCOBALAMIN) 100 MCG tablet, Take 0.5 tablets by mouth Daily., Disp: , Rfl:        Objective   Vital Signs:   Resp 14   Ht 165.1 cm (65\")   Wt 97.1 kg (214 lb)   BMI 35.61 kg/m²       Physical Exam  Vitals and nursing note reviewed.   Constitutional:       Appearance: Normal appearance. The patient is well-developed.   Cardiovascular:      Rate and Rhythm: Normal rate and regular rhythm.   Pulmonary:      Effort: Pulmonary effort is normal.      Breath sounds: Normal air entry.   Abdominal:      General: Bowel sounds are normal.      Palpations: Abdomen is soft.      Skin:     General: Skin is warm and dry.   Neurological:      Mental Status: The patient is alert and oriented to person, place, and " time.      Motor: Motor function is intact.   Psychiatric:         Mood and Affect: Mood normal.   Axilla: She has a scar in the right axilla with an ulcer just above the scar.    Result Review :               Assessment and Plan   Diagnoses and all orders for this visit:    1. Hidradenitis suppurativa (Primary)  -     Case Request; Standing  -     Follow Anesthesia Guidelines / Protocol; Standing  -     Verify NPO Status; Standing  -     Verify / Perform Chlorhexidine Skin Prep; Standing  -     Insert Peripheral IV; Standing  -     Saline Lock & Maintain IV Access; Standing  -     sodium chloride 0.9 % flush 10 mL  -     sodium chloride 0.9 % flush 10 mL  -     sodium chloride 0.9 % infusion 40 mL  -     Place Sequential Compression Device; Standing  -     Maintain Sequential Compression Device; Standing  -     ondansetron (ZOFRAN) injection 4 mg  -     ceFAZolin (ANCEF) 2,000 mg in sodium chloride 0.9 % 100 mL IVPB    We will schedule her for reexcision of this ulcer secondary to hidradenitis.  I told her I think it would be better if we could get back to healthier subcutaneous fat and hopefully we can get this to heal in a little better.  I have described the procedure to her as well as the risk and benefits and she is agreeable to proceeding.    I  Mart Frederick MD  12/09/2024

## 2024-12-20 NOTE — NURSING NOTE
PT REPORTS HX OF ALPHA GAL, ALPHA GAL SCREENING QUESTIONNAIRE COMPLETED. FAXED TO PHARMACY. O.R. NOTIFIED VIA SPECIAL NEEDS  CASE ENTRY. CHART TO ANESTHESIA FOR REVIEW.

## 2024-12-20 NOTE — PRE-PROCEDURE INSTRUCTIONS
ATIENT INSTRUCTED TO BE:    - NOTHING TO EAT AFTER MIDNIGHT OR CHEW, EXCEPT CAN HAVE CLEAR LIQUIDS 2 HOURS PRIOR TO SURGERY ARRIVAL TIME , NO MORE THAN 8 OZ. (NOTHING RED)     - TO HOLD ALL VITAMINS, SUPPLEMENTS, NSAIDS FOR ONE WEEK PRIOR TO THEIR SURGICAL PROCEDURE    - DO NOT TAKE __NA____________________ 7 DAYS PRIOR TO PROCEDURE PER ANESTHESIA RECOMMENDATIONS/INSTRUCTIONS     - INSTRUCTED PT TO USE SURGICAL SOAP 1 TIME THE NIGHT PRIOR TO SURGERY ___________ OR THE AM OF SURGERY _____________   USE THE SOAP FROM NECK TO TOES, AVOID THEIR FACE, HAIR, AND PRIVATE PARTS. IF USE THE SOAP THE NIGHT PRIOR TO SURGERY, CHANGE BED LINENS AND NO PETS IN THE BED.     INSTRUCTED NO LOTIONS, JEWELRY, PIERCINGS,  NAIL POLISH, OR DEODORANT DAY OF SURGERY    - IF DIABETIC, CHECK BLOOD GLUCOSE IF LESS THAN 70 OR HAVING SYMPTOMS CALL THE PREOP AREA FOR INSTRUCTIONS ON AM OF SURGERY ([phone])    -INSTRUCTED TO TAKE THE FOLLOWING MEDICATIONS THE DAY OF SURGERY WITH SIPS OF WATER:   LAMICTAL        - DO NOT BRING ANY MEDICATIONS WITH YOU TO THE HOSPITAL THE DAY OF SURGERY, EXCEPT IF USE INHALERS. BRING INHALERS DAY OF SURGERY       - BRING CPAP OR BIPAP TO THE HOSPITAL ONLY IF YOU ARE SPENDING THE NIGHT    - DO NOT SMOKE OR VAPE 24 HOURS PRIOR TO PROCEDURE PER ANESTHESIA REQUEST     -MAKE SURE YOU HAVE A RIDE HOME OR SOMEONE TO STAY WITH YOU THE DAY OF THE PROCEDURE AFTER YOU GO HOME     - FOLLOW ANY OTHER INSTRUCTIONS GIVEN TO YOU BY YOUR SURGEON'S OFFICE.     - DAY OF SURGERY COME TO [facility] FLOOR. CHECK IN AT THE DESK FOR REGISTRATION/SURGERY    - YOU WILL RECEIVE A PHONE CALL THE DAY PRIOR TO SURGERY BETWEEN 1PM AND 4 PM WITH ARRIVAL TIME, IF YOUR SURGERY IS ON A MONDAY YOU WILL RECEIVE A CALL THE FRIDAY PRIOR TO SURGERY DATE    - BRING CASH OR CREDIT CARD FOR COPAYMENT OF MEDICATIONS AFTER SURGERY IF YOU USE THE HOSPITAL PHARMACY (MEDS TO BED)    - PREADMISSION TESTING NURSE MANUELA HOANG [phone] IF  HAVE ANY QUESTIONS     -PATIENT PROVIDED THE NUMBER FOR PREOP SURGICAL DEPT IF HAD QUESTIONS AFTER HOURS PRIOR TO SURGERY ([phone]).  INFORMED PT IF NO ANSWER, LEAVE A MESSAGE AND SOMEONE WILL RETURN THEIR CALL       PATIENT VERBALIZED UNDERSTANDING

## 2024-12-26 ENCOUNTER — ANESTHESIA EVENT (OUTPATIENT)
Dept: PERIOP | Facility: HOSPITAL | Age: 29
End: 2024-12-26
Payer: COMMERCIAL

## 2024-12-26 ENCOUNTER — ANESTHESIA (OUTPATIENT)
Dept: PERIOP | Facility: HOSPITAL | Age: 29
End: 2024-12-26
Payer: COMMERCIAL

## 2024-12-26 ENCOUNTER — HOSPITAL ENCOUNTER (OUTPATIENT)
Facility: HOSPITAL | Age: 29
Setting detail: HOSPITAL OUTPATIENT SURGERY
Discharge: HOME | End: 2024-12-26
Attending: SURGERY | Admitting: SURGERY
Payer: COMMERCIAL

## 2024-12-26 VITALS
WEIGHT: 210.76 LBS | HEIGHT: 65 IN | OXYGEN SATURATION: 100 % | RESPIRATION RATE: 23 BRPM | BODY MASS INDEX: 35.11 KG/M2 | TEMPERATURE: 98.3 F | DIASTOLIC BLOOD PRESSURE: 60 MMHG | SYSTOLIC BLOOD PRESSURE: 134 MMHG | HEART RATE: 64 BPM

## 2024-12-26 DIAGNOSIS — L73.2 HIDRADENITIS SUPPURATIVA: ICD-10-CM

## 2024-12-26 PROCEDURE — 25010000002 CEFAZOLIN PER 500 MG: Performed by: SURGERY

## 2024-12-26 PROCEDURE — 25010000002 LIDOCAINE PF 2% 2 % SOLUTION: Performed by: NURSE ANESTHETIST, CERTIFIED REGISTERED

## 2024-12-26 PROCEDURE — 25010000002 DEXAMETHASONE PER 1 MG: Performed by: NURSE ANESTHETIST, CERTIFIED REGISTERED

## 2024-12-26 PROCEDURE — 25010000002 FENTANYL CITRATE (PF) 50 MCG/ML SOLUTION: Performed by: NURSE ANESTHETIST, CERTIFIED REGISTERED

## 2024-12-26 PROCEDURE — 25810000003 LACTATED RINGERS PER 1000 ML: Performed by: ANESTHESIOLOGY

## 2024-12-26 PROCEDURE — 25010000002 ONDANSETRON PER 1 MG: Performed by: NURSE ANESTHETIST, CERTIFIED REGISTERED

## 2024-12-26 PROCEDURE — 25010000002 PROPOFOL 10 MG/ML EMULSION: Performed by: NURSE ANESTHETIST, CERTIFIED REGISTERED

## 2024-12-26 PROCEDURE — 25010000002 MIDAZOLAM PER 1MG: Performed by: ANESTHESIOLOGY

## 2024-12-26 PROCEDURE — 11450 EXC SKN HDRDNT AX SMPL/NTRM: CPT | Performed by: SURGERY

## 2024-12-26 PROCEDURE — 25010000002 BUPIVACAINE (PF) 0.25 % SOLUTION: Performed by: SURGERY

## 2024-12-26 PROCEDURE — 88304 TISSUE EXAM BY PATHOLOGIST: CPT | Performed by: SURGERY

## 2024-12-26 RX ORDER — DEXAMETHASONE SODIUM PHOSPHATE 4 MG/ML
INJECTION, SOLUTION INTRA-ARTICULAR; INTRALESIONAL; INTRAMUSCULAR; INTRAVENOUS; SOFT TISSUE AS NEEDED
Status: DISCONTINUED | OUTPATIENT
Start: 2024-12-26 | End: 2024-12-26 | Stop reason: SURG

## 2024-12-26 RX ORDER — TRAMADOL HYDROCHLORIDE 50 MG/1
50 TABLET ORAL EVERY 6 HOURS PRN
Qty: 10 TABLET | Refills: 0 | Status: SHIPPED | OUTPATIENT
Start: 2024-12-26

## 2024-12-26 RX ORDER — SCOLOPAMINE TRANSDERMAL SYSTEM 1 MG/1
1 PATCH, EXTENDED RELEASE TRANSDERMAL ONCE
Status: DISCONTINUED | OUTPATIENT
Start: 2024-12-26 | End: 2024-12-26 | Stop reason: HOSPADM

## 2024-12-26 RX ORDER — ONDANSETRON 4 MG/1
4 TABLET, ORALLY DISINTEGRATING ORAL ONCE AS NEEDED
Status: DISCONTINUED | OUTPATIENT
Start: 2024-12-26 | End: 2024-12-26 | Stop reason: HOSPADM

## 2024-12-26 RX ORDER — IBUPROFEN 600 MG/1
600 TABLET, FILM COATED ORAL EVERY 6 HOURS PRN
Status: DISCONTINUED | OUTPATIENT
Start: 2024-12-26 | End: 2024-12-26 | Stop reason: HOSPADM

## 2024-12-26 RX ORDER — ONDANSETRON 2 MG/ML
4 INJECTION INTRAMUSCULAR; INTRAVENOUS EVERY 6 HOURS PRN
Status: DISCONTINUED | OUTPATIENT
Start: 2024-12-26 | End: 2024-12-26 | Stop reason: HOSPADM

## 2024-12-26 RX ORDER — SODIUM CHLORIDE 9 MG/ML
40 INJECTION, SOLUTION INTRAVENOUS AS NEEDED
Status: DISCONTINUED | OUTPATIENT
Start: 2024-12-26 | End: 2024-12-26 | Stop reason: HOSPADM

## 2024-12-26 RX ORDER — SODIUM CHLORIDE 0.9 % (FLUSH) 0.9 %
10 SYRINGE (ML) INJECTION EVERY 12 HOURS SCHEDULED
Status: DISCONTINUED | OUTPATIENT
Start: 2024-12-26 | End: 2024-12-26 | Stop reason: HOSPADM

## 2024-12-26 RX ORDER — ONDANSETRON 2 MG/ML
4 INJECTION INTRAMUSCULAR; INTRAVENOUS ONCE AS NEEDED
Status: DISCONTINUED | OUTPATIENT
Start: 2024-12-26 | End: 2024-12-26 | Stop reason: HOSPADM

## 2024-12-26 RX ORDER — HYDROCODONE BITARTRATE AND ACETAMINOPHEN 5; 325 MG/1; MG/1
1 TABLET ORAL ONCE AS NEEDED
Status: DISCONTINUED | OUTPATIENT
Start: 2024-12-26 | End: 2024-12-26 | Stop reason: HOSPADM

## 2024-12-26 RX ORDER — ACETAMINOPHEN 500 MG
1000 TABLET ORAL ONCE
Status: COMPLETED | OUTPATIENT
Start: 2024-12-26 | End: 2024-12-26

## 2024-12-26 RX ORDER — MEPERIDINE HYDROCHLORIDE 25 MG/ML
12.5 INJECTION INTRAMUSCULAR; INTRAVENOUS; SUBCUTANEOUS
Status: DISCONTINUED | OUTPATIENT
Start: 2024-12-26 | End: 2024-12-26 | Stop reason: HOSPADM

## 2024-12-26 RX ORDER — SODIUM CHLORIDE, SODIUM LACTATE, POTASSIUM CHLORIDE, CALCIUM CHLORIDE 600; 310; 30; 20 MG/100ML; MG/100ML; MG/100ML; MG/100ML
9 INJECTION, SOLUTION INTRAVENOUS CONTINUOUS PRN
Status: DISCONTINUED | OUTPATIENT
Start: 2024-12-26 | End: 2024-12-26 | Stop reason: HOSPADM

## 2024-12-26 RX ORDER — ONDANSETRON 2 MG/ML
INJECTION INTRAMUSCULAR; INTRAVENOUS AS NEEDED
Status: DISCONTINUED | OUTPATIENT
Start: 2024-12-26 | End: 2024-12-26 | Stop reason: SURG

## 2024-12-26 RX ORDER — BUPIVACAINE HYDROCHLORIDE 2.5 MG/ML
INJECTION, SOLUTION EPIDURAL; INFILTRATION; INTRACAUDAL AS NEEDED
Status: DISCONTINUED | OUTPATIENT
Start: 2024-12-26 | End: 2024-12-26 | Stop reason: HOSPADM

## 2024-12-26 RX ORDER — MIDAZOLAM HYDROCHLORIDE 2 MG/2ML
2 INJECTION, SOLUTION INTRAMUSCULAR; INTRAVENOUS ONCE
Status: COMPLETED | OUTPATIENT
Start: 2024-12-26 | End: 2024-12-26

## 2024-12-26 RX ORDER — FENTANYL CITRATE 50 UG/ML
INJECTION, SOLUTION INTRAMUSCULAR; INTRAVENOUS AS NEEDED
Status: DISCONTINUED | OUTPATIENT
Start: 2024-12-26 | End: 2024-12-26 | Stop reason: SURG

## 2024-12-26 RX ORDER — SODIUM CHLORIDE 0.9 % (FLUSH) 0.9 %
10 SYRINGE (ML) INJECTION AS NEEDED
Status: DISCONTINUED | OUTPATIENT
Start: 2024-12-26 | End: 2024-12-26 | Stop reason: HOSPADM

## 2024-12-26 RX ORDER — LIDOCAINE HYDROCHLORIDE 20 MG/ML
INJECTION, SOLUTION EPIDURAL; INFILTRATION; INTRACAUDAL; PERINEURAL AS NEEDED
Status: DISCONTINUED | OUTPATIENT
Start: 2024-12-26 | End: 2024-12-26 | Stop reason: SURG

## 2024-12-26 RX ORDER — OXYCODONE HYDROCHLORIDE 5 MG/1
5 TABLET ORAL
Status: DISCONTINUED | OUTPATIENT
Start: 2024-12-26 | End: 2024-12-26 | Stop reason: HOSPADM

## 2024-12-26 RX ORDER — PROMETHAZINE HYDROCHLORIDE 25 MG/1
25 SUPPOSITORY RECTAL ONCE AS NEEDED
Status: DISCONTINUED | OUTPATIENT
Start: 2024-12-26 | End: 2024-12-26 | Stop reason: HOSPADM

## 2024-12-26 RX ORDER — PROMETHAZINE HYDROCHLORIDE 12.5 MG/1
25 TABLET ORAL ONCE AS NEEDED
Status: DISCONTINUED | OUTPATIENT
Start: 2024-12-26 | End: 2024-12-26 | Stop reason: HOSPADM

## 2024-12-26 RX ORDER — PROPOFOL 10 MG/ML
VIAL (ML) INTRAVENOUS AS NEEDED
Status: DISCONTINUED | OUTPATIENT
Start: 2024-12-26 | End: 2024-12-26 | Stop reason: SURG

## 2024-12-26 RX ADMIN — FENTANYL CITRATE 100 MCG: 50 INJECTION, SOLUTION INTRAMUSCULAR; INTRAVENOUS at 13:55

## 2024-12-26 RX ADMIN — OXYCODONE 5 MG: 5 TABLET ORAL at 14:45

## 2024-12-26 RX ADMIN — MIDAZOLAM HYDROCHLORIDE 2 MG: 1 INJECTION, SOLUTION INTRAMUSCULAR; INTRAVENOUS at 13:44

## 2024-12-26 RX ADMIN — ACETAMINOPHEN 1000 MG: 500 TABLET ORAL at 12:23

## 2024-12-26 RX ADMIN — LIDOCAINE HYDROCHLORIDE 100 MG: 20 INJECTION, SOLUTION INTRAVENOUS at 13:55

## 2024-12-26 RX ADMIN — SODIUM CHLORIDE, POTASSIUM CHLORIDE, SODIUM LACTATE AND CALCIUM CHLORIDE 9 ML/HR: 600; 310; 30; 20 INJECTION, SOLUTION INTRAVENOUS at 12:24

## 2024-12-26 RX ADMIN — SCOLOPAMINE TRANSDERMAL SYSTEM 1 PATCH: 1 PATCH, EXTENDED RELEASE TRANSDERMAL at 12:23

## 2024-12-26 RX ADMIN — SODIUM CHLORIDE 2000 MG: 9 INJECTION, SOLUTION INTRAVENOUS at 13:51

## 2024-12-26 RX ADMIN — ONDANSETRON 4 MG: 2 INJECTION INTRAMUSCULAR; INTRAVENOUS at 14:03

## 2024-12-26 RX ADMIN — DEXAMETHASONE SODIUM PHOSPHATE 4 MG: 4 INJECTION, SOLUTION INTRAMUSCULAR; INTRAVENOUS at 14:03

## 2024-12-26 RX ADMIN — PROPOFOL 200 MG: 10 INJECTION, EMULSION INTRAVENOUS at 13:55

## 2024-12-26 NOTE — ANESTHESIA PREPROCEDURE EVALUATION
Anesthesia Evaluation     Patient summary reviewed and Nursing notes reviewed   history of anesthetic complications:  PONV  NPO Solid Status: > 8 hours  NPO Liquid Status: > 2 hours           Airway   Mallampati: II  TM distance: >3 FB  Neck ROM: full  No difficulty expected  Dental      Pulmonary - negative pulmonary ROS and normal exam    breath sounds clear to auscultation  Cardiovascular - negative cardio ROS and normal exam  Exercise tolerance: good (4-7 METS)    Rhythm: regular  Rate: normal        Neuro/Psych  (+) headaches, psychiatric history Depression and Anxiety  GI/Hepatic/Renal/Endo    (+) GERD well controlled    Musculoskeletal (-) negative ROS    Abdominal    Substance History - negative use     OB/GYN negative ob/gyn ROS         Other - negative ROS       ROS/Med Hx Other: >4METS,HX ALPHAGAL ALLERGY,CHICKEN ALLERGY (STATES CAN EAT EGGS), GERD. KT               Anesthesia Plan    ASA 2     general     (Patient understands anesthesia not responsible for dental damage.  Scope patch preop)  intravenous induction     Anesthetic plan, risks, benefits, and alternatives have been provided, discussed and informed consent has been obtained with: patient.  Pre-procedure education provided  Plan discussed with CRNA.    CODE STATUS:

## 2024-12-26 NOTE — ANESTHESIA POSTPROCEDURE EVALUATION
Patient: Chetna Woody    Procedure Summary       Date: 12/26/24 Room / Location: [facility]  / [facility]    Anesthesia Start: 1350 Anesthesia Stop: 1425    Procedure: Excise axillary hidradenitis (Right) Diagnosis:       Hidradenitis suppurativa      (Hidradenitis suppurativa [L73.2])    Surgeons: Mart Frederick MD Provider: Rick Santana MD    Anesthesia Type: general ASA Status: 2            Anesthesia Type: general    Vitals  Vitals Value Taken Time   /57 12/26/24 1434   Temp 36.8 °C (98.3 °F) 12/26/24 1424   Pulse 80 12/26/24 1438   Resp 16 12/26/24 1430   SpO2 100 % 12/26/24 1438   Vitals shown include unfiled device data.        Post Anesthesia Care and Evaluation    Patient location during evaluation: bedside  Patient participation: complete - patient participated  Level of consciousness: awake and alert  Pain management: adequate    Airway patency: patent  Anesthetic complications: No anesthetic complications  PONV Status: none  Cardiovascular status: acceptable  Respiratory status: acceptable  Hydration status: acceptable    Comments: An Anesthesiologist personally participated in the most demanding procedures (including induction and emergence if applicable) in the anesthesia plan, monitored the course of anesthesia administration at frequent intervals and remained physically present and available for immediate diagnosis and treatment of emergencies.

## 2024-12-26 NOTE — OP NOTE
EXCISION LESION  Procedure Report    Patient Name:  Chetna Woody  YOB: 1995    Date of Surgery:  12/26/2024     Indications: The patient is a 29-year-old female who has had prior surgery for right axillary hidradenitis.  She developed a recurrent ulcer just above her old scar and the decision was made to proceed with an excision of this recurrent hidradenitis focus.    Pre-op Diagnosis: Hidradenitis suppurativa of the right axilla    Post-Op Diagnosis: Same    Procedure/CPT® Codes:    Excision of hidradenitis suppurativa of the right axilla    Staff:  Surgeon(s):  Mart Frederick MD    Assistant: Roxanne Lynch CRNFA    Anesthesia: General    Estimated Blood Loss: minimal    Implants:    Nothing was implanted during the procedure    Specimen:          Specimens       ID Source Type Tests Collected By Collected At Frozen?    A Axilla, Right Tissue TISSUE PATHOLOGY EXAM   Mart Frederick MD 12/26/24 6609     Description: right axillary skin ulcer tissue                Findings: 1.5 cm skin ulcer    Complications: None    Description of Procedure: The patient was taken the operating room and placed on the table in supine position.  After administration of general anesthesia, the right axilla was prepped and draped sterilely.  She had a small inflamed ulcer just above and lateral to her old surgical scar there in the right axilla.  I excised this ulcer with an elliptical incision and the ulcer was about 1.5 cm in greatest diameter.  We dissected down into the subcutaneous tissues to healthy looking fat and excised the skin ulcer.  After removing this from the field we achieved adequate hemostasis with the Bovie.  The wound was irrigated out with sterile saline and then the skin was closed primarily with interrupted 3-0 nylon sutures.  Sterile dressings were applied and she was taken the postanesthesia recovery room in stable condition.    Assistant: Roxanne Lynch CRNFA  was responsible for  performing the following activities: Retraction, Suction, and Placing Dressing and their skilled assistance was necessary for the success of this case.    Mart Frederick MD     Date: 12/26/2024  Time: 14:17 EST

## 2024-12-26 NOTE — DISCHARGE INSTRUCTIONS
DISCHARGE INSTRUCTIONS  SURGICAL / AMBULATORY  PROCEDURES      For your surgery you had:  General anesthesia (you may have a sore throat for the first 24 hours)  IV sedation.  Local anesthesia  Monitored anesthesia Care  You received a medicated patch for nausea prevention today (behind your ear). It is recommended that you remove it 24-48 hours post-operatively. It must be removed within 72 hours.   You have received an anesthesia medication today that can cause hormonal forms of birth control to be ineffective. You should use a different form of birth control (to prevent pregnancy) for 7 days.   You may experience dizziness, drowsiness, or light-headedness for several hours following surgery/procedure.  Do not stay alone today or tonight.  Limit your activity for 24 hours.  Resume your diet slowly.  Follow whatever special dietary instructions you may have been given by your doctor.  You should not drive or operate machinery, drink alcohol, or sign legally binding documents for 24 hours or while you are taking pain medication.  Last dose of pain medication was given at:   .  NOTIFY YOUR DOCTOR IF YOU EXPERIENCE ANY OF THE FOLLOWING:  Temperature greater than 101 degrees Fahrenheit  Shaking Chills  Redness or excessive drainage from incision  Nausea, vomiting and/or pain that is not controlled by prescribed medications  Increase in bleeding or bleeding that is excessive  Unable to urinate in 6 hours after surgery  If unable to reach your doctor, please go to the closest Emergency Room  You may remove dressing in    [] in 24 hours   [x] in 48 hours   [] Other:      You may shower or bathe  in 48 hours.  Apply an ice pack 24-48 hours.  Medications per physician instructions as indicated on Discharge Medication Information Sheet.  Last dose of pain medication given at 2:45   May take next dose of pain medication at   SPECIAL INSTRUCTIONS:

## 2024-12-26 NOTE — LETTER
December 26, 2024     Patient: Chetna Woody   YOB: 1995   Date of Visit: 12/9/2024       To Whom It May Concern:    It is my medical opinion that Chetna Woody may return to work on 12/31/24 .           Sincerely,     [facility]    CC: No Recipients

## 2024-12-30 ENCOUNTER — TELEPHONE (OUTPATIENT)
Dept: UROLOGY | Age: 29
End: 2024-12-30
Payer: COMMERCIAL

## 2024-12-30 NOTE — TELEPHONE ENCOUNTER
Patient called, she stated that she had surgery on 12/26, she stated that her stitches have come open, she stated that there are 2 stitches remaining, she stated that it happened yesterday; there is bleeding. After talking to Sweetie I told her that she can come in on Friday; in the mean time keep it clean, dry and covered; patient verbalized understanding

## 2025-01-03 ENCOUNTER — OFFICE VISIT (OUTPATIENT)
Dept: SURGERY | Facility: CLINIC | Age: 30
End: 2025-01-03
Payer: COMMERCIAL

## 2025-01-03 VITALS — WEIGHT: 210 LBS | BODY MASS INDEX: 34.99 KG/M2 | HEIGHT: 65 IN

## 2025-01-03 DIAGNOSIS — L73.2 HIDRADENITIS SUPPURATIVA: Primary | ICD-10-CM

## 2025-01-03 PROCEDURE — 99024 POSTOP FOLLOW-UP VISIT: CPT | Performed by: SURGERY

## 2025-01-03 NOTE — PROGRESS NOTES
Chief Complaint  Post-op Follow-up (Stitches came out on 2025.)    Subjective          Chetna Woody presents to [facility] GENERAL SURGERY  History of Present Illness    Chetna Woody is a 29 y.o. female  who presents today for a postoperative visit.     Patient is here for a follow-up after excision of right axillary hidradenitis.  We had gone ahead and closed that primarily but the nylon sutures pull-through.  She is little bit sore there.    Past History:  Medical History: has a past medical history of Amenorrhea, Anemia, Anesthesia, Boils, Congenital meatal stenosis, Constipation, GERD (gastroesophageal reflux disease), Glands swollen, Headache, Heart burn, Hidradenitis suppurativa, IC (interstitial cystitis), Insulin resistance (2018), Psoriasis, and Zi Mountain spotted fever.   Surgical History: has a past surgical history that includes Adenoidectomy; Cervix surgery ();  section (); Cystoscopy; Salpingectomy (2021); Tonsillectomy (); Urethral Dilatation; Laser ablation (2021); [place] tooth extraction (2017); Tubal ligation; d & c hysteroscopy endometrial ablation; Ablation colpoclesis; Axillary node dissection (Right, 2024); and Excision Lesion (Right, 2024).   Family History: family history includes Anxiety disorder in her mother and sister; Diabetes type II in her paternal grandfather and paternal grandmother; Heart disease in her mother; Hyperlipidemia in her paternal grandfather; Hypertension in her paternal grandfather; Hypothyroidism in her maternal grandmother, mother, sister, and another family member.   Social History: reports that she quit smoking about 2 years ago. Her smoking use included cigarettes. She started smoking about 15 years ago. She has a 13 pack-year smoking history. She has been exposed to tobacco smoke. She has never used smokeless tobacco. She reports current alcohol use. She reports that she does  "not use drugs.  Allergies: Erythromycin and Sulfamethoxazole-trimethoprim       Current Outpatient Medications:     Cholecalciferol 25 MCG (1000 UT) tablet, Take 1 tablet by mouth Daily., Disp: , Rfl:     Cosentyx UnoReady 300 MG/2ML solution auto-injector, Inject 300 mg under the skin into the appropriate area as directed 1 (One) Time Per Week., Disp: , Rfl:     lamoTRIgine (LaMICtal) 25 MG tablet, Take 2 tablets by mouth Daily., Disp: , Rfl:     mupirocin (BACTROBAN) 2 % ointment, Apply 1 Application topically to the appropriate area as directed 3 (Three) Times a Day., Disp: 30 g, Rfl: 0    traMADol (ULTRAM) 50 MG tablet, Take 1 tablet by mouth Every 6 (Six) Hours As Needed for Moderate Pain., Disp: 10 tablet, Rfl: 0    vitamin B-12 (CYANOCOBALAMIN) 100 MCG tablet, Take 0.5 tablets by mouth Daily., Disp: , Rfl:     pseudoephedrine (SudoGest) 30 MG tablet, Take 1 tablet by mouth Every 6 (Six) Hours As Needed for Congestion. (Patient not taking: Reported on 12/26/2024), Disp: 30 tablet, Rfl: 0       Physical Exam  Wound actually looks very clean I went ahead and remove the sutures  Objective     Vital Signs:   Ht 165.1 cm (65\")   Wt 95.3 kg (210 lb)   BMI 34.95 kg/m²              Assessment and Plan    Diagnoses and all orders for this visit:    1. Hidradenitis suppurativa (Primary)    However start daily dressing changes.  I will see her back in a week.      "

## 2025-01-03 NOTE — LETTER
January 3, 2025     PHYLICIA Pat  [address] [place] [address]    Patient: Chetna Woody   YOB: 1995   Date of Visit: 1/3/2025     Dear PHYLICIA Pat:       Thank you for referring Chetna Woody to me for evaluation. Below are the relevant portions of my assessment and plan of care.    If you have questions, please do not hesitate to call me. I look forward to following Chetna along with you.         Sincerely,        Mart Frederick MD        CC: No Recipients    Mart Frederick MD  [id number]  Sign when Signing Visit  Chief Complaint  Post-op Follow-up (Stitches came out on 2025.)    Subjective         Chetna Woody presents to [facility] GENERAL SURGERY  History of Present Illness    Chetna Woody is a 29 y.o. female  who presents today for a postoperative visit.     Patient is here for a follow-up after excision of right axillary hidradenitis.  We had gone ahead and closed that primarily but the nylon sutures pull-through.  She is little bit sore there.    Past History:  Medical History: has a past medical history of Amenorrhea, Anemia, Anesthesia, Boils, Congenital meatal stenosis, Constipation, GERD (gastroesophageal reflux disease), Glands swollen, Headache, Heart burn, Hidradenitis suppurativa, IC (interstitial cystitis), Insulin resistance (2018), Psoriasis, and Zi Mountain spotted fever.   Surgical History: has a past surgical history that includes Adenoidectomy; Cervix surgery ();  section (); Cystoscopy; Salpingectomy (2021); Tonsillectomy (); Urethral Dilatation; Laser ablation (2021); [place] tooth extraction (2017); Tubal ligation; d & c hysteroscopy endometrial ablation; Ablation colpoclesis; Axillary node dissection (Right, 2024); and Excision Lesion (Right, 2024).   Family History: family history includes Anxiety disorder in her mother and sister; Diabetes  "type II in her paternal grandfather and paternal grandmother; Heart disease in her mother; Hyperlipidemia in her paternal grandfather; Hypertension in her paternal grandfather; Hypothyroidism in her maternal grandmother, mother, sister, and another family member.   Social History: reports that she quit smoking about 2 years ago. Her smoking use included cigarettes. She started smoking about 15 years ago. She has a 13 pack-year smoking history. She has been exposed to tobacco smoke. She has never used smokeless tobacco. She reports current alcohol use. She reports that she does not use drugs.  Allergies: Erythromycin and Sulfamethoxazole-trimethoprim       Current Outpatient Medications:   •  Cholecalciferol 25 MCG (1000 UT) tablet, Take 1 tablet by mouth Daily., Disp: , Rfl:   •  Cosentyx UnoReady 300 MG/2ML solution auto-injector, Inject 300 mg under the skin into the appropriate area as directed 1 (One) Time Per Week., Disp: , Rfl:   •  lamoTRIgine (LaMICtal) 25 MG tablet, Take 2 tablets by mouth Daily., Disp: , Rfl:   •  mupirocin (BACTROBAN) 2 % ointment, Apply 1 Application topically to the appropriate area as directed 3 (Three) Times a Day., Disp: 30 g, Rfl: 0  •  traMADol (ULTRAM) 50 MG tablet, Take 1 tablet by mouth Every 6 (Six) Hours As Needed for Moderate Pain., Disp: 10 tablet, Rfl: 0  •  vitamin B-12 (CYANOCOBALAMIN) 100 MCG tablet, Take 0.5 tablets by mouth Daily., Disp: , Rfl:   •  pseudoephedrine (SudoGest) 30 MG tablet, Take 1 tablet by mouth Every 6 (Six) Hours As Needed for Congestion. (Patient not taking: Reported on 12/26/2024), Disp: 30 tablet, Rfl: 0       Physical Exam  Wound actually looks very clean I went ahead and remove the sutures  Objective    Vital Signs:   Ht 165.1 cm (65\")   Wt 95.3 kg (210 lb)   BMI 34.95 kg/m²              Assessment and Plan    Diagnoses and all orders for this visit:    1. Hidradenitis suppurativa (Primary)    However start daily dressing changes.  I will see " her back in a week.

## 2025-01-10 ENCOUNTER — OFFICE VISIT (OUTPATIENT)
Dept: SURGERY | Facility: CLINIC | Age: 30
End: 2025-01-10
Payer: COMMERCIAL

## 2025-01-10 VITALS — WEIGHT: 216 LBS | BODY MASS INDEX: 35.99 KG/M2 | HEIGHT: 65 IN | RESPIRATION RATE: 16 BRPM

## 2025-01-10 DIAGNOSIS — L73.2 HIDRADENITIS SUPPURATIVA: Primary | ICD-10-CM

## 2025-01-10 PROCEDURE — 99024 POSTOP FOLLOW-UP VISIT: CPT | Performed by: SURGERY

## 2025-01-10 RX ORDER — HYDROXYZINE HYDROCHLORIDE 25 MG/1
TABLET, FILM COATED ORAL
COMMUNITY
Start: 2025-01-02

## 2025-01-10 RX ORDER — LAMOTRIGINE 100 MG/1
TABLET ORAL
COMMUNITY
Start: 2025-01-02

## 2025-01-10 NOTE — PROGRESS NOTES
Chief Complaint  Hidradenitis and Post-op    Subjective          Chetna Woody presents to [facility] GENERAL SURGERY  History of Present Illness    Chetna Woody is a 29 y.o. female  who presents today for a postoperative visit.     Patient is here for a follow-up after excision of right axillary hidradenitis.    Past History:  Medical History: has a past medical history of Amenorrhea, Anemia, Anesthesia, Boils, Congenital meatal stenosis, Constipation, GERD (gastroesophageal reflux disease), Glands swollen, Headache, Heart burn, Hidradenitis suppurativa, IC (interstitial cystitis), Insulin resistance (2018), Psoriasis, and Zi Mountain spotted fever.   Surgical History: has a past surgical history that includes Adenoidectomy; Cervix surgery ();  section (); Cystoscopy; Salpingectomy (2021); Tonsillectomy (); Urethral Dilatation; Laser ablation (2021); [place] tooth extraction (2017); Tubal ligation; d & c hysteroscopy endometrial ablation; Ablation colpoclesis; Axillary node dissection (Right, 2024); and Excision Lesion (Right, 2024).   Family History: family history includes Anxiety disorder in her mother and sister; Diabetes type II in her paternal grandfather and paternal grandmother; Heart disease in her mother; Hyperlipidemia in her paternal grandfather; Hypertension in her paternal grandfather; Hypothyroidism in her maternal grandmother, mother, sister, and another family member.   Social History: reports that she quit smoking about 2 years ago. Her smoking use included cigarettes. She started smoking about 15 years ago. She has a 13 pack-year smoking history. She has been exposed to tobacco smoke. She has never used smokeless tobacco. She reports current alcohol use. She reports that she does not use drugs.  Allergies: Erythromycin and Sulfamethoxazole-trimethoprim       Current Outpatient Medications:     Cholecalciferol 25 MCG  "(1000 UT) tablet, Take 1 tablet by mouth Daily., Disp: , Rfl:     hydrOXYzine (ATARAX) 25 MG tablet, , Disp: , Rfl:     lamoTRIgine (LaMICtal) 100 MG tablet, , Disp: , Rfl:     lamoTRIgine (LaMICtal) 25 MG tablet, Take 2 tablets by mouth Daily., Disp: , Rfl:     mupirocin (BACTROBAN) 2 % ointment, Apply 1 Application topically to the appropriate area as directed 3 (Three) Times a Day., Disp: 30 g, Rfl: 0    vitamin B-12 (CYANOCOBALAMIN) 100 MCG tablet, Take 0.5 tablets by mouth Daily., Disp: , Rfl:        Physical Exam  Her right axillary wound looks clean.  Objective     Vital Signs:   Resp 16   Ht 165.1 cm (65\")   Wt 98 kg (216 lb)   BMI 35.94 kg/m²              Assessment and Plan    Diagnoses and all orders for this visit:    1. Hidradenitis suppurativa (Primary)    I will see her back in 4 weeks.  I have asked her to continue to do her local wound care as she is doing currently.  I will see her back otherwise on a as needed basis.      "

## 2025-02-07 ENCOUNTER — OFFICE VISIT (OUTPATIENT)
Dept: SURGERY | Facility: CLINIC | Age: 30
End: 2025-02-07
Payer: COMMERCIAL

## 2025-02-07 VITALS — HEIGHT: 65 IN | WEIGHT: 206 LBS | BODY MASS INDEX: 34.32 KG/M2

## 2025-02-07 DIAGNOSIS — L73.2 HIDRADENITIS SUPPURATIVA: Primary | ICD-10-CM

## 2025-02-07 PROCEDURE — 99024 POSTOP FOLLOW-UP VISIT: CPT | Performed by: SURGERY

## 2025-02-07 RX ORDER — SECUKINUMAB 300 MG/2ML
INJECTION SUBCUTANEOUS
COMMUNITY
Start: 2025-01-24

## 2025-02-07 RX ORDER — DESVENLAFAXINE 25 MG/1
TABLET, EXTENDED RELEASE ORAL
COMMUNITY
Start: 2025-01-30

## 2025-02-07 NOTE — PROGRESS NOTES
Chief Complaint  Follow-up (right axillary hidradenitis)    Subjective          Chetna Woody presents to [facility] GENERAL SURGERY  History of Present Illness    Chetna Woody is a 29 y.o. female  who presents today for a postoperative visit.     Patient is here for a follow-up after a right axillary excision for hidradenitis.  She is doing pretty well.  She is not having any pain or drainage at this point.    Past History:  Medical History: has a past medical history of Amenorrhea, Anemia, Anesthesia, Boils, Congenital meatal stenosis, Constipation, GERD (gastroesophageal reflux disease), Glands swollen, Headache, Heart burn, Hidradenitis suppurativa, IC (interstitial cystitis), Insulin resistance (2018), Psoriasis, and Zi Mountain spotted fever.   Surgical History: has a past surgical history that includes Adenoidectomy; Cervix surgery ();  section (); Cystoscopy; Salpingectomy (2021); Tonsillectomy (); Urethral Dilatation; Laser ablation (2021); [place] tooth extraction (2017); Tubal ligation; d & c hysteroscopy endometrial ablation; Ablation colpoclesis; Axillary node dissection (Right, 2024); and Excision Lesion (Right, 2024).   Family History: family history includes Anxiety disorder in her mother and sister; Diabetes type II in her paternal grandfather and paternal grandmother; Heart disease in her mother; Hyperlipidemia in her paternal grandfather; Hypertension in her paternal grandfather; Hypothyroidism in her maternal grandmother, mother, sister, and another family member.   Social History: reports that she quit smoking about 2 years ago. Her smoking use included cigarettes. She started smoking about 15 years ago. She has a 13 pack-year smoking history. She has been exposed to tobacco smoke. She has never used smokeless tobacco. She reports current alcohol use. She reports that she does not use drugs.  Allergies: Erythromycin  "and Sulfamethoxazole-trimethoprim       Current Outpatient Medications:     Cholecalciferol 25 MCG (1000 UT) tablet, Take 1 tablet by mouth Daily., Disp: , Rfl:     Cosentyx UnoReady 300 MG/2ML solution auto-injector, , Disp: , Rfl:     Desvenlafaxine Succinate ER 25 MG tablet sustained-release 24 hour, , Disp: , Rfl:     hydrOXYzine (ATARAX) 25 MG tablet, , Disp: , Rfl:     lamoTRIgine (LaMICtal) 100 MG tablet, , Disp: , Rfl:     lamoTRIgine (LaMICtal) 25 MG tablet, Take 2 tablets by mouth Daily., Disp: , Rfl:     mupirocin (BACTROBAN) 2 % ointment, Apply 1 Application topically to the appropriate area as directed 3 (Three) Times a Day., Disp: 30 g, Rfl: 0    vitamin B-12 (CYANOCOBALAMIN) 100 MCG tablet, Take 0.5 tablets by mouth Daily., Disp: , Rfl:        Physical Exam  Her wound has closed.  Everything looks pretty good today.  Objective     Vital Signs:   Ht 165.1 cm (65\")   Wt 93.4 kg (206 lb)   BMI 34.28 kg/m²              Assessment and Plan    Diagnoses and all orders for this visit:    1. Hidradenitis suppurativa (Primary)    At this point she seems to be doing well.  I will see her back on an as-needed basis.      "

## 2025-02-10 ENCOUNTER — HOSPITAL ENCOUNTER (EMERGENCY)
Facility: HOSPITAL | Age: 30
Discharge: LEFT BEFORE DISCHARGE | End: 2025-02-11
Attending: EMERGENCY MEDICINE | Admitting: EMERGENCY MEDICINE
Payer: COMMERCIAL

## 2025-02-10 VITALS
TEMPERATURE: 98.4 F | BODY MASS INDEX: 34.45 KG/M2 | SYSTOLIC BLOOD PRESSURE: 136 MMHG | HEIGHT: 65 IN | OXYGEN SATURATION: 100 % | DIASTOLIC BLOOD PRESSURE: 65 MMHG | RESPIRATION RATE: 18 BRPM | HEART RATE: 81 BPM | WEIGHT: 206.79 LBS

## 2025-02-10 LAB
ALBUMIN SERPL-MCNC: 4.3 G/DL (ref 3.5–5.2)
ALBUMIN/GLOB SERPL: 1.4 G/DL
ALP SERPL-CCNC: 59 U/L (ref 39–117)
ALT SERPL W P-5'-P-CCNC: 16 U/L (ref 1–33)
ANION GAP SERPL CALCULATED.3IONS-SCNC: 10.9 MMOL/L (ref 5–15)
AST SERPL-CCNC: 13 U/L (ref 1–32)
BASOPHILS # BLD AUTO: 0.03 10*3/MM3 (ref 0–0.2)
BASOPHILS NFR BLD AUTO: 0.4 % (ref 0–1.5)
BILIRUB SERPL-MCNC: 0.2 MG/DL (ref 0–1.2)
BILIRUB UR QL STRIP: NEGATIVE
BUN SERPL-MCNC: 9 MG/DL (ref 6–20)
BUN/CREAT SERPL: 10.5 (ref 7–25)
CALCIUM SPEC-SCNC: 9.6 MG/DL (ref 8.6–10.5)
CHLORIDE SERPL-SCNC: 105 MMOL/L (ref 98–107)
CLARITY UR: CLEAR
CO2 SERPL-SCNC: 25.1 MMOL/L (ref 22–29)
COLOR UR: YELLOW
CREAT SERPL-MCNC: 0.86 MG/DL (ref 0.57–1)
DEPRECATED RDW RBC AUTO: 40.6 FL (ref 37–54)
EGFRCR SERPLBLD CKD-EPI 2021: 93.9 ML/MIN/1.73
EOSINOPHIL # BLD AUTO: 0.09 10*3/MM3 (ref 0–0.4)
EOSINOPHIL NFR BLD AUTO: 1.3 % (ref 0.3–6.2)
ERYTHROCYTE [DISTWIDTH] IN BLOOD BY AUTOMATED COUNT: 11.7 % (ref 12.3–15.4)
GLOBULIN UR ELPH-MCNC: 3.1 GM/DL
GLUCOSE SERPL-MCNC: 71 MG/DL (ref 65–99)
GLUCOSE UR STRIP-MCNC: NEGATIVE MG/DL
HCG INTACT+B SERPL-ACNC: <0.5 MIU/ML
HCT VFR BLD AUTO: 40.5 % (ref 34–46.6)
HGB BLD-MCNC: 13.1 G/DL (ref 12–15.9)
HGB UR QL STRIP.AUTO: NEGATIVE
HOLD SPECIMEN: NORMAL
HOLD SPECIMEN: NORMAL
IMM GRANULOCYTES # BLD AUTO: 0.02 10*3/MM3 (ref 0–0.05)
IMM GRANULOCYTES NFR BLD AUTO: 0.3 % (ref 0–0.5)
KETONES UR QL STRIP: NEGATIVE
LEUKOCYTE ESTERASE UR QL STRIP.AUTO: NEGATIVE
LIPASE SERPL-CCNC: 30 U/L (ref 13–60)
LYMPHOCYTES # BLD AUTO: 1.9 10*3/MM3 (ref 0.7–3.1)
LYMPHOCYTES NFR BLD AUTO: 26.7 % (ref 19.6–45.3)
MCH RBC QN AUTO: 30.5 PG (ref 26.6–33)
MCHC RBC AUTO-ENTMCNC: 32.3 G/DL (ref 31.5–35.7)
MCV RBC AUTO: 94.2 FL (ref 79–97)
MONOCYTES # BLD AUTO: 0.58 10*3/MM3 (ref 0.1–0.9)
MONOCYTES NFR BLD AUTO: 8.2 % (ref 5–12)
NEUTROPHILS NFR BLD AUTO: 4.49 10*3/MM3 (ref 1.7–7)
NEUTROPHILS NFR BLD AUTO: 63.1 % (ref 42.7–76)
NITRITE UR QL STRIP: NEGATIVE
NRBC BLD AUTO-RTO: 0 /100 WBC (ref 0–0.2)
PH UR STRIP.AUTO: 6.5 [PH] (ref 5–8)
PLATELET # BLD AUTO: 271 10*3/MM3 (ref 140–450)
PMV BLD AUTO: 9.7 FL (ref 6–12)
POTASSIUM SERPL-SCNC: 3.4 MMOL/L (ref 3.5–5.2)
PROT SERPL-MCNC: 7.4 G/DL (ref 6–8.5)
PROT UR QL STRIP: NEGATIVE
RBC # BLD AUTO: 4.3 10*6/MM3 (ref 3.77–5.28)
SODIUM SERPL-SCNC: 141 MMOL/L (ref 136–145)
SP GR UR STRIP: 1.02 (ref 1–1.03)
UROBILINOGEN UR QL STRIP: NORMAL
WBC NRBC COR # BLD AUTO: 7.11 10*3/MM3 (ref 3.4–10.8)
WHOLE BLOOD HOLD COAG: NORMAL
WHOLE BLOOD HOLD SPECIMEN: NORMAL

## 2025-02-10 PROCEDURE — 85025 COMPLETE CBC W/AUTO DIFF WBC: CPT | Performed by: EMERGENCY MEDICINE

## 2025-02-10 PROCEDURE — 81003 URINALYSIS AUTO W/O SCOPE: CPT | Performed by: EMERGENCY MEDICINE

## 2025-02-10 PROCEDURE — 84702 CHORIONIC GONADOTROPIN TEST: CPT | Performed by: EMERGENCY MEDICINE

## 2025-02-10 PROCEDURE — 93005 ELECTROCARDIOGRAM TRACING: CPT

## 2025-02-10 PROCEDURE — 36415 COLL VENOUS BLD VENIPUNCTURE: CPT | Performed by: EMERGENCY MEDICINE

## 2025-02-10 PROCEDURE — 99283 EMERGENCY DEPT VISIT LOW MDM: CPT

## 2025-02-10 PROCEDURE — 80053 COMPREHEN METABOLIC PANEL: CPT | Performed by: EMERGENCY MEDICINE

## 2025-02-10 PROCEDURE — 83690 ASSAY OF LIPASE: CPT | Performed by: EMERGENCY MEDICINE

## 2025-02-10 RX ORDER — SODIUM CHLORIDE 0.9 % (FLUSH) 0.9 %
10 SYRINGE (ML) INJECTION AS NEEDED
Status: DISCONTINUED | OUTPATIENT
Start: 2025-02-10 | End: 2025-02-11 | Stop reason: HOSPADM

## 2025-02-10 NOTE — ED PROVIDER NOTES
Time: 6:59 PM EST  Date of encounter:  2/10/2025  Independent Historian/Clinical History and Information was obtained by:   Patient    History is limited by: N/A    Chief Complaint: Epigastric pain      History of Present Illness:  Patient is a 29 y.o. year old female who presents to the emergency department for evaluation of epigastric abdominal pain that started today while trying to eat.  Patient states this happened 1 other time since November.  Has a history of GERD and takes over-the-counter medications.  Patient states the pain radiates to her back.  Admits to nausea.  Denies vomiting diarrhea.      Patient Care Team  Primary Care Provider: Pallavi Koch APRN    Past Medical History:     Allergies   Allergen Reactions    Erythromycin Hives    Sulfamethoxazole-Trimethoprim Rash     Past Medical History:   Diagnosis Date    Amenorrhea     Anemia     ASYMPTOMATIC    Anesthesia     NAUSEA  AFTER    Boils     Congenital meatal stenosis     Constipation     GERD (gastroesophageal reflux disease)     Glands swollen     Headache     Heart burn     Hidradenitis suppurativa     IC (interstitial cystitis)     Insulin resistance 2018    AS A TEEN    Psoriasis     Zi Mountain spotted fever     FROM TICK BITE, NO S/S NOW     Past Surgical History:   Procedure Laterality Date    ABLATION COLPOCLESIS      ADENOIDECTOMY      AXILLARY NODE DISSECTION Right 2024    Procedure: AXILLARY HIDRADENITIS EXCISION;  Surgeon: Mart Frederick MD;  Location: [facility] OR [facility];  Service: General;  Laterality: Right;    CERVIX SURGERY      cyst and scar tissue remover right cervical neck- LYMPH NODES REMOVED DELFIN     SECTION      baby girl    CYSTOSCOPY      D & C HYSTEROSCOPY ENDOMETRIAL ABLATION      EXCISION LESION Right 2024    Procedure: Excise axillary hidradenitis;  Surgeon: Mart Frederick MD;  Location: [facility];  Service: General;  Laterality: Right;    LASER ABLATION  2021     Uterine    SALPINGECTOMY  2021    TONSILLECTOMY      TUBAL ABDOMINAL LIGATION      URETHRAL DILATATION      WISDOM TOOTH EXTRACTION  2017     Family History   Problem Relation Age of Onset    Anxiety disorder Mother     Hypothyroidism Mother     Heart disease Mother     Anxiety disorder Sister     Hypothyroidism Sister     Hypothyroidism Maternal Grandmother     Diabetes type II Paternal Grandmother     Diabetes type II Paternal Grandfather     Hyperlipidemia Paternal Grandfather     Hypertension Paternal Grandfather     Hypothyroidism Other     Malig Hyperthermia Neg Hx        Home Medications:  Prior to Admission medications    Medication Sig Start Date End Date Taking? Authorizing Provider   Cholecalciferol 25 MCG (1000 UT) tablet Take 1 tablet by mouth Daily.    Jennifer Foster MD   Cosentyx UnoReady 300 MG/2ML solution auto-injector  25   Jennifer Foster MD   Desvenlafaxine Succinate ER 25 MG tablet sustained-release 24 hour  25   Jennifer Foster MD   hydrOXYzine (ATARAX) 25 MG tablet  25   Jennifer Foster MD   lamoTRIgine (LaMICtal) 100 MG tablet  25   Jennifer Foster MD   lamoTRIgine (LaMICtal) 25 MG tablet Take 2 tablets by mouth Daily.    Jennifer Foster MD   mupirocin (BACTROBAN) 2 % ointment Apply 1 Application topically to the appropriate area as directed 3 (Three) Times a Day. 24   Diallo Nguyen MD   vitamin B-12 (CYANOCOBALAMIN) 100 MCG tablet Take 0.5 tablets by mouth Daily.    Jennifer Foster MD        Social History:   Social History     Tobacco Use    Smoking status: Former     Current packs/day: 0.00     Average packs/day: 1 pack/day for 13.0 years (13.0 ttl pk-yrs)     Types: Cigarettes     Start date:      Quit date:      Years since quittin.1     Passive exposure: Past    Smokeless tobacco: Never    Tobacco comments:     INSTRUCTED NO SMOKING 24 HR PRIOR TO SURGERY PER ANESTHESIA ORDERS   Vaping  "Use    Vaping status: Every Day    Substances: Nicotine (2%), INSTRUCTED NO VAPING 24 HR PRIOR TO SURGERY PER ANESTHESIA ORDERS    Devices: Disposable   Substance Use Topics    Alcohol use: Yes     Comment: Social     Drug use: Never         Review of Systems:  Review of Systems   Gastrointestinal:  Positive for abdominal pain and nausea. Negative for diarrhea and vomiting.   Genitourinary:  Negative for dysuria.        Physical Exam:  /65 (BP Location: Right arm, Patient Position: Sitting)   Pulse 81   Temp 98.4 °F (36.9 °C) (Oral)   Resp 18   Ht 165.1 cm (65\")   Wt 93.8 kg (206 lb 12.7 oz)   SpO2 100%   BMI 34.41 kg/m²     Physical Exam  HENT:      Head: Normocephalic.      Mouth/Throat:      Mouth: Mucous membranes are moist.   Eyes:      Pupils: Pupils are equal, round, and reactive to light.   Pulmonary:      Effort: Pulmonary effort is normal.   Abdominal:      General: There is no distension.   Musculoskeletal:      Cervical back: Neck supple.   Skin:     General: Skin is warm and dry.   Neurological:      General: No focal deficit present.      Mental Status: She is alert and oriented to person, place, and time.   Psychiatric:         Mood and Affect: Mood normal.         Behavior: Behavior normal.                  Medical Decision Making:      Comorbidities that affect care:        External Notes reviewed:          The following orders were placed and all results were independently analyzed by me:  Orders Placed This Encounter   Procedures    Bumpass Draw    Comprehensive Metabolic Panel    Lipase    Urinalysis With Microscopic If Indicated (No Culture) - Urine, Clean Catch    hCG, Quantitative, Pregnancy    CBC Auto Differential    ECG 12 Lead Other; Gastric abdominal pain    CBC & Differential    Green Top (Gel)    Lavender Top    Gold Top - SST    Light Blue Top       Medications Given in the Emergency Department:  Medications - No data to display     ED Course:    ED Course as of 02/11/25 " 1432   Mon Feb 10, 2025   1859 --- PROVIDER IN TRIAGE NOTE ---    The patient was evaluated by Carlos arvizu in triage. Orders were placed and the patient is currently awaiting disposition.    [AJ]      ED Course User Index  [AJ] Carlos Reich PA-C       Labs:    Lab Results (last 24 hours)       Procedure Component Value Units Date/Time    CBC & Differential [[id number]]  (Abnormal) Collected: 02/10/25 1903    Specimen: Blood from Arm, Left Updated: 02/10/25 1936    Narrative:      The following orders were created for panel order CBC & Differential.  Procedure                               Abnormality         Status                     ---------                               -----------         ------                     CBC Auto Differential[[id number]]        Abnormal            Final result                 Please view results for these tests on the individual orders.    Comprehensive Metabolic Panel [[id number]]  (Abnormal) Collected: 02/10/25 1903    Specimen: Blood from Arm, Left Updated: 02/10/25 1959     Glucose 71 mg/dL      BUN 9 mg/dL      Creatinine 0.86 mg/dL      Sodium 141 mmol/L      Potassium 3.4 mmol/L      Chloride 105 mmol/L      CO2 25.1 mmol/L      Calcium 9.6 mg/dL      Total Protein 7.4 g/dL      Albumin 4.3 g/dL      ALT (SGPT) 16 U/L      AST (SGOT) 13 U/L      Alkaline Phosphatase 59 U/L      Total Bilirubin 0.2 mg/dL      Globulin 3.1 gm/dL      A/G Ratio 1.4 g/dL      BUN/Creatinine Ratio 10.5     Anion Gap 10.9 mmol/L      eGFR 93.9 mL/min/1.73     Narrative:      GFR Categories in Chronic Kidney Disease (CKD)      GFR Category          GFR (mL/min/1.73)    Interpretation  G1                     90 or greater         Normal or high (1)  G2                      60-89                Mild decrease (1)  G3a                   45-59                Mild to moderate decrease  G3b                   30-44                Moderate to severe decrease  G4                    15-29                 Severe decrease  G5                    14 or less           Kidney failure          (1)In the absence of evidence of kidney disease, neither GFR category G1 or G2 fulfill the criteria for CKD.    eGFR calculation 2021 CKD-EPI creatinine equation, which does not include race as a factor    Lipase [[id number]]  (Normal) Collected: 02/10/25 1903    Specimen: Blood from Arm, Left Updated: 02/10/25 1959     Lipase 30 U/L     hCG, Quantitative, Pregnancy [[id number]] Collected: 02/10/25 1903    Specimen: Blood from Arm, Left Updated: 02/10/25 1955     HCG Quantitative <0.50 mIU/mL     Narrative:      HCG Ranges by Gestational Age    Females - non-pregnant premenopausal   </= 1mIU/mL HCG  Females - postmenopausal               </= 7mIU/mL HCG    3 Weeks       5.4   -      72 mIU/mL  4 Weeks      10.2   -     708 mIU/mL  5 Weeks       217   -   8,245 mIU/mL  6 Weeks       152   -  32,177 mIU/mL  7 Weeks     4,059   - 153,767 mIU/mL  8 Weeks    31,366   - 149,094 mIU/mL  9 Weeks    59,109   - 135,901 mIU/mL  10 Weeks   44,186   - 170,409 mIU/mL  12 Weeks   27,107   - 201,615 mIU/mL  14 Weeks   24,302   -  93,646 mIU/mL  15 Weeks   12,540   -  69,747 mIU/mL  16 Weeks    8,904   -  55,332 mIU/mL  17 Weeks    8,240   -  51,793 mIU/mL  18 Weeks    9,649   -  55,271 mIU/mL      CBC Auto Differential [[id number]]  (Abnormal) Collected: 02/10/25 1903    Specimen: Blood from Arm, Left Updated: 02/10/25 1936     WBC 7.11 10*3/mm3      RBC 4.30 10*6/mm3      Hemoglobin 13.1 g/dL      Hematocrit 40.5 %      MCV 94.2 fL      MCH 30.5 pg      MCHC 32.3 g/dL      RDW 11.7 %      RDW-SD 40.6 fl      MPV 9.7 fL      Platelets 271 10*3/mm3      Neutrophil % 63.1 %      Lymphocyte % 26.7 %      Monocyte % 8.2 %      Eosinophil % 1.3 %      Basophil % 0.4 %      Immature Grans % 0.3 %      Neutrophils, Absolute 4.49 10*3/mm3      Lymphocytes, Absolute 1.90 10*3/mm3      Monocytes, Absolute 0.58 10*3/mm3      Eosinophils, Absolute 0.09  10*3/mm3      Basophils, Absolute 0.03 10*3/mm3      Immature Grans, Absolute 0.02 10*3/mm3      nRBC 0.0 /100 WBC     Urinalysis With Microscopic If Indicated (No Culture) - Urine, Clean Catch [[id number]]  (Normal) Collected: 02/10/25 1916    Specimen: Urine, Clean Catch Updated: 02/10/25 1946     Color, UA Yellow     Appearance, UA Clear     pH, UA 6.5     Specific Gravity, UA 1.016     Glucose, UA Negative     Ketones, UA Negative     Bilirubin, UA Negative     Blood, UA Negative     Protein, UA Negative     Leuk Esterase, UA Negative     Nitrite, UA Negative     Urobilinogen, UA 0.2 E.U./dL    Narrative:      Urine microscopic not indicated.             Imaging:    No Radiology Exams Resulted Within Past 24 Hours      Differential Diagnosis and Discussion:    Abdominal Pain: Based on the patient's signs and symptoms, I considered abdominal aortic aneurysm, small bowel obstruction, pancreatitis, acute cholecystitis, acute appendecitis, peptic ulcer disease, gastritis, colitis, endocrine disorders, irritable bowel syndrome and other differential diagnosis an etiology of the patient's abdominal pain.    PROCEDURES:        ECG 12 Lead Other; Gastric abdominal pain   Final Result   HEART RATE=76  bpm   RR Rpeetazu=523  ms   CT Wvcxjtjf=597  ms   P Horizontal Axis=17  deg   P Front Axis=41  deg   QRSD Interval=76  ms   QT Velbztae=970  ms   ZGrI=204  ms   QRS Axis=41  deg   T Wave Axis=35  deg   - NORMAL ECG -   Sinus rhythm   No previous ECG available for comparison   Electronically Signed By: Andrea Daugherty ([facility]) 2025-02-11 14:19:15   Date and Time of Study:2025-02-10 19:18:39          Procedures    MDM                     Patient Care Considerations:          Consultants/Shared Management Plan:        Social Determinants of Health:          Disposition and Care Coordination:    Eloped: This patient has left the emergency department or waiting room with no communication to myself, nursing or administrative staff. There  was no opportunity to discuss the patient's decision to leave, provide medical advice or discuss alternatives to. The staff has made efforts to locate patient without success.        Final diagnoses:   None        ED Disposition       ED Disposition   Eloped    Condition   --    Comment   --               This medical record created using voice recognition software.             Carlos Reich PA-C  02/11/25 1439

## 2025-02-11 LAB
QT INTERVAL: 376 MS
QTC INTERVAL: 424 MS

## 2025-05-13 ENCOUNTER — OFFICE VISIT (OUTPATIENT)
Dept: FAMILY MEDICINE CLINIC | Facility: CLINIC | Age: 30
End: 2025-05-13
Payer: COMMERCIAL

## 2025-05-13 VITALS
TEMPERATURE: 97.8 F | WEIGHT: 192.5 LBS | SYSTOLIC BLOOD PRESSURE: 110 MMHG | OXYGEN SATURATION: 99 % | HEIGHT: 65 IN | DIASTOLIC BLOOD PRESSURE: 72 MMHG | HEART RATE: 82 BPM | BODY MASS INDEX: 32.07 KG/M2

## 2025-05-13 DIAGNOSIS — N63.0 LUMP IN FEMALE BREAST: Primary | ICD-10-CM

## 2025-05-13 PROCEDURE — 99213 OFFICE O/P EST LOW 20 MIN: CPT | Performed by: NURSE PRACTITIONER

## 2025-05-13 PROCEDURE — 1159F MED LIST DOCD IN RCRD: CPT | Performed by: NURSE PRACTITIONER

## 2025-05-13 PROCEDURE — 1126F AMNT PAIN NOTED NONE PRSNT: CPT | Performed by: NURSE PRACTITIONER

## 2025-05-13 PROCEDURE — 1160F RVW MEDS BY RX/DR IN RCRD: CPT | Performed by: NURSE PRACTITIONER

## 2025-05-13 RX ORDER — BUSPIRONE HYDROCHLORIDE 10 MG/1
TABLET ORAL
COMMUNITY
Start: 2025-04-24

## 2025-05-13 RX ORDER — LAMOTRIGINE 150 MG/1
TABLET ORAL
COMMUNITY
Start: 2025-04-03

## 2025-05-13 NOTE — PROGRESS NOTES
Chief Complaint  lump rt breast    Subjective          Chetna Constance Saxena presents to [facility]  History of Present Illness    History of Present Illness  The patient presents for evaluation of a lump in her right breast.    She reports the presence of a palpable, immobile mass in the inferior region of her right breast, which she discovered during a self-examination while in a supine position. Additionally, she notes the development of a transient rash beneath the mass, which has since resolved, leaving a residual dark spot. She expresses concern about potential lymphatic involvement, given her history of lymph node surgery. She also reports intermittent nipple sensitivity and occasional white discharge from the nipple.    She is currently on a regimen of Lamictal 150 mg and BuSpar 10 mg twice daily. She is also taking B12.    SOCIAL HISTORY  The patient vapes.      Patient or patient representative verbalized consent for the use of Ambient Listening during the visit with  PHYLICIA Pat for chart documentation. 2025  10:27 EDT                     Allergies  Erythromycin and Sulfamethoxazole-trimethoprim    Social History     Tobacco Use    Smoking status: Former     Current packs/day: 0.00     Average packs/day: 1 pack/day for 13.0 years (13.0 ttl pk-yrs)     Types: Cigarettes     Start date:      Quit date:      Years since quittin.3     Passive exposure: Past    Smokeless tobacco: Never    Tobacco comments:     INSTRUCTED NO SMOKING 24 HR PRIOR TO SURGERY PER ANESTHESIA ORDERS   Vaping Use    Vaping status: Every Day    Substances: Nicotine (2%), INSTRUCTED NO VAPING 24 HR PRIOR TO SURGERY PER ANESTHESIA ORDERS    Devices: Disposable   Substance Use Topics    Alcohol use: Yes     Comment: Social     Drug use: Never       Family History   Problem Relation Age of Onset    Anxiety disorder Mother     Hypothyroidism Mother     Heart disease Mother      "Anxiety disorder Sister     Hypothyroidism Sister     Hypothyroidism Maternal Grandmother     Diabetes type II Paternal Grandmother     Diabetes type II Paternal Grandfather     Hyperlipidemia Paternal Grandfather     Hypertension Paternal Grandfather     Hypothyroidism Other     Malig Hyperthermia Neg Hx         There are no preventive care reminders to display for this patient.     Immunization History   Administered Date(s) Administered    COVID-19 (MODERNA) 1st,2nd,3rd Dose Monovalent 04/30/2021, 05/28/2021    DTP / HiB 01/29/1996    DTaP, Unspecified 07/29/1999    Fluzone (or Fluarix & Flulaval for VFC) >6mos 11/11/2014    Hep A, 2 Dose 02/13/2007    Hep B, Adolescent or Pediatric 08/13/1996    Influenza TIV (IM) 11/11/2014    MMR 07/29/1999, 08/30/1999    Meningococcal Conjugate 02/13/2007    OPV 01/29/1996, 07/29/1999    PEDS-Pneumococcal Conjugate (PCV7) 11/11/2014    Pneumococcal Conjugate 13-Valent (PCV13) 11/11/2014    Tdap 12/27/2021    Varicella 05/18/2001, 02/13/2007       Review of Systems   Constitutional:  Positive for fatigue. Negative for chills, diaphoresis and fever.   HENT:  Negative for congestion and sore throat.    Respiratory:  Negative for cough.    Cardiovascular:  Negative for chest pain.   Gastrointestinal:  Negative for abdominal pain, nausea and vomiting.   Genitourinary:  Negative for dysuria.   Musculoskeletal:  Negative for myalgias and neck pain.   Skin:  Positive for rash.   Neurological:  Negative for weakness and numbness.        Objective       Vitals:    05/13/25 0817   BP: 110/72   BP Location: Left arm   Patient Position: Sitting   Cuff Size: Adult   Pulse: 82   Temp: 97.8 °F (36.6 °C)   SpO2: 99%   Weight: 87.3 kg (192 lb 8 oz)   Height: 165.1 cm (65\")       Body mass index is 32.03 kg/m².         Physical Exam  Chest:          Comments: Just right under the [place] on the picture.  Well rounded nodule noted over the scar.          Physical Exam  Breast: A well-rounded " nodule is noted in the right breast at the 6 o'clock position, approximately 3 to 4 mm in size. Rash noted underneath the nodule, with dark spot present.              Result Review :     The following data was reviewed by: PHYLICIA Pat on 05/13/2025:            No Images in the past 120 days found..         Results                      Assessment and Plan      Assessment & Plan  Lump in female breast    Orders:    Mammo Diagnostic Digital Tomosynthesis Right With CAD; Future    US Breast Right Limited; Future       Diagnosis Plan   1. Lump in female breast  Mammo Diagnostic Digital Tomosynthesis Right With CAD    US Breast Right Limited            Assessment & Plan  1. Right breast lump.  - The lump is located at the 6 o'clock position, directly over the scar tissue, and measures approximately 3 to 4 mm.  - It appears to be cystic in nature.  - A diagnostic mammogram and ultrasound of the right breast have been ordered to further evaluate the lump.  - If the lump is confirmed to be cystic, a referral to a breast specialist will be made for potential excision.    2. Rash under the right breast.  - The rash is intermittent, appearing inflamed and then subsiding.  - It is associated with the lump and may be due to underlying inflammation or drainage from the lymph tissue.  - The diagnostic mammogram and ultrasound will help determine the cause.  - If necessary, antibiotics may be considered based on the findings.          Follow Up     No follow-ups on file.    Patient was given instructions and counseling regarding her condition or for health maintenance advice. Please see specific information pulled into the AVS if appropriate.     Parts of this note are electronic transcriptions/translations of spoken language to printed text using the Dragon Dictation system.          PHYLICIA Pat  05/13/2025

## 2025-05-20 ENCOUNTER — HOSPITAL ENCOUNTER (OUTPATIENT)
Dept: ULTRASOUND IMAGING | Facility: HOSPITAL | Age: 30
Discharge: HOME | End: 2025-05-20
Payer: COMMERCIAL

## 2025-05-20 ENCOUNTER — RESULTS FOLLOW-UP (OUTPATIENT)
Dept: FAMILY MEDICINE CLINIC | Facility: CLINIC | Age: 30
End: 2025-05-20
Payer: COMMERCIAL

## 2025-05-20 ENCOUNTER — HOSPITAL ENCOUNTER (OUTPATIENT)
Dept: MAMMOGRAPHY | Facility: HOSPITAL | Age: 30
Discharge: HOME | End: 2025-05-20
Payer: COMMERCIAL

## 2025-05-20 DIAGNOSIS — N63.0 LUMP IN FEMALE BREAST: ICD-10-CM

## 2025-05-20 PROCEDURE — 76642 ULTRASOUND BREAST LIMITED: CPT

## 2025-05-21 ENCOUNTER — TELEPHONE (OUTPATIENT)
Dept: FAMILY MEDICINE CLINIC | Facility: CLINIC | Age: 30
End: 2025-05-21

## 2025-05-21 NOTE — TELEPHONE ENCOUNTER
Caller: Chetna Saxena    Relationship to patient: Self    Best call back number: [phone]    Patient is needing: Patient called in returning a call to clinical staff and is requesting a call back please. Patient said it is okay to leave message on phone.

## 2025-06-16 ENCOUNTER — OFFICE VISIT (OUTPATIENT)
Dept: FAMILY MEDICINE CLINIC | Facility: CLINIC | Age: 30
End: 2025-06-16
Payer: COMMERCIAL

## 2025-06-16 VITALS
OXYGEN SATURATION: 99 % | TEMPERATURE: 97.7 F | BODY MASS INDEX: 32.22 KG/M2 | DIASTOLIC BLOOD PRESSURE: 69 MMHG | HEART RATE: 87 BPM | SYSTOLIC BLOOD PRESSURE: 109 MMHG | HEIGHT: 65 IN | WEIGHT: 193.4 LBS | RESPIRATION RATE: 16 BRPM

## 2025-06-16 DIAGNOSIS — N64.52 BREAST DISCHARGE: Primary | ICD-10-CM

## 2025-06-16 PROCEDURE — 1159F MED LIST DOCD IN RCRD: CPT | Performed by: NURSE PRACTITIONER

## 2025-06-16 PROCEDURE — 1160F RVW MEDS BY RX/DR IN RCRD: CPT | Performed by: NURSE PRACTITIONER

## 2025-06-16 PROCEDURE — 99213 OFFICE O/P EST LOW 20 MIN: CPT | Performed by: NURSE PRACTITIONER

## 2025-06-16 PROCEDURE — 1125F AMNT PAIN NOTED PAIN PRSNT: CPT | Performed by: NURSE PRACTITIONER

## 2025-06-16 NOTE — PROGRESS NOTES
Chief Complaint  Breast Pain (Right breast)    Subjective          Chetna Saxena presents to [facility]  History of Present Illness    History of Present Illness  The patient presents for evaluation of right breast pain.    She has been experiencing discomfort in her right breast for approximately one week, accompanied by drainage from the nipple. She suspects a possible infection, although she is uncertain about its origin. She reports feeling physically drained and extremely fatigued. She has not had any recent nipple piercings, with the last one being several years ago. An ultrasound was performed in 2025, but a mammogram was not conducted due to her age. During the ultrasound, the technician was able to feel what she was feeling, but they did not know what it was. She was told that she was fine and that there was no need for a biopsy. She has not attempted to express any fluid from her left breast.    FAMILY HISTORY  Her great-grandmother had breast cancer.      Patient or patient representative verbalized consent for the use of Ambient Listening during the visit with  PHYLICIA Pat for chart documentation. 2025  09:24 EDT                     Allergies  Erythromycin and Sulfamethoxazole-trimethoprim    Social History     Tobacco Use    Smoking status: Former     Current packs/day: 0.00     Average packs/day: 1 pack/day for 13.0 years (13.0 ttl pk-yrs)     Types: Cigarettes     Start date:      Quit date:      Years since quittin.4     Passive exposure: Past    Smokeless tobacco: Never    Tobacco comments:     INSTRUCTED NO SMOKING 24 HR PRIOR TO SURGERY PER ANESTHESIA ORDERS   Vaping Use    Vaping status: Every Day    Substances: Nicotine (2%), INSTRUCTED NO VAPING 24 HR PRIOR TO SURGERY PER ANESTHESIA ORDERS    Devices: Disposable   Substance Use Topics    Alcohol use: Yes     Comment: Social     Drug use: Never       Family History   Problem  "Relation Age of Onset    Anxiety disorder Mother     Hypothyroidism Mother     Heart disease Mother     Anxiety disorder Sister     Hypothyroidism Sister     Hypothyroidism Maternal Grandmother     Diabetes type II Paternal Grandmother     Diabetes type II Paternal Grandfather     Hyperlipidemia Paternal Grandfather     Hypertension Paternal Grandfather     Hypothyroidism Other     Malig Hyperthermia Neg Hx         Health Maintenance Due   Topic Date Due    COVID-19 Vaccine (3 - 2024-25 season) 09/01/2024        Immunization History   Administered Date(s) Administered    COVID-19 (MODERNA) 1st,2nd,3rd Dose Monovalent 04/30/2021, 05/28/2021    DTP / HiB 01/29/1996    DTaP, Unspecified 07/29/1999    Fluzone (or Fluarix & Flulaval for VFC) >6mos 11/11/2014    Hep A, 2 Dose 02/13/2007    Hep B, Adolescent or Pediatric 08/13/1996    Influenza TIV (IM) 11/11/2014    MMR 07/29/1999, 08/30/1999    Meningococcal Conjugate 02/13/2007    OPV 01/29/1996, 07/29/1999    PEDS-Pneumococcal Conjugate (PCV7) 11/11/2014    Pneumococcal Conjugate 13-Valent (PCV13) 11/11/2014    Tdap 12/27/2021    Varicella 05/18/2001, 02/13/2007       Review of Systems   Genitourinary:  Positive for breast discharge and breast pain.        Objective       Vitals:    06/16/25 0904   BP: 109/69   Pulse: 87   Resp: 16   Temp: 97.7 °F (36.5 °C)   SpO2: 99%   Weight: 87.7 kg (193 lb 6.4 oz)   Height: 165.1 cm (65\")       Body mass index is 32.18 kg/m².         Physical Exam  Constitutional:       Appearance: Normal appearance.   HENT:      Head: Normocephalic.   Pulmonary:      Effort: Pulmonary effort is normal.   Chest:   Breasts:     Right: Nipple discharge and tenderness present.      Left: Normal.       Skin:     Findings: No bruising.   Neurological:      General: No focal deficit present.      Mental Status: She is alert and oriented to person, place, and time.   Psychiatric:         Mood and Affect: Mood normal.         Behavior: Behavior normal.    "      Thought Content: Thought content normal.         Judgment: Judgment normal.           Physical Exam  Breast: Right breast is tender with evidence of drainage. No abnormalities detected in the left breast.              Result Review :     The following data was reviewed by: PHYLICIA Pat on 06/16/2025:            US Breast Right Limited  Result Date: 5/20/2025  No sonographic correlate to the patient's symptom of right breast lump at 5:00, 6 cm from the nipple. Recommend management of this symptom based on findings at clinical examination. I discussed results with the patient following the exam.  BI-RADS ASSESSMENT: Category 1: Negative            5/20/2025 10:11 AM by Lucinda Juan MD on Workstation: [id number]             Results  Imaging   - Ultrasound of the right breast: 05/2025, No significant findings                    Assessment and Plan      Assessment & Plan  Breast discharge    Orders:    Breast Discharge Cytology ([facility] Only); Future       Diagnosis Plan   1. Breast discharge  Breast Discharge Cytology ([facility] Only)            Assessment & Plan  1. Right breast pain.  - Reports right breast pain starting about a week ago, with drainage from the nipple area.  - Increased tenderness and drainage observed during the physical exam.  - Discussed the possibility of a clogged milk duct or hormonal changes causing increased prolactin levels.  - Cytology test will be conducted to investigate the cause of the discharge; advised to apply warm compresses to the affected area for 10 to 15 minutes. Mammogram ordered.          Follow Up     No follow-ups on file.    Patient was given instructions and counseling regarding her condition or for health maintenance advice. Please see specific information pulled into the AVS if appropriate.     Parts of this note are electronic transcriptions/translations of spoken language to printed text using the Dragon Dictation system.          Pallavi Koch  APRN  06/16/2025

## 2025-06-17 ENCOUNTER — CLINICAL SUPPORT (OUTPATIENT)
Dept: FAMILY MEDICINE CLINIC | Facility: CLINIC | Age: 30
End: 2025-06-17
Payer: COMMERCIAL

## 2025-06-17 DIAGNOSIS — N64.52 BREAST DISCHARGE: Primary | ICD-10-CM

## 2025-06-17 RX ORDER — CEPHALEXIN 500 MG/1
500 CAPSULE ORAL 2 TIMES DAILY
Qty: 14 CAPSULE | Refills: 0 | Status: SHIPPED | OUTPATIENT
Start: 2025-06-17 | End: 2025-06-24

## 2025-06-17 NOTE — PROGRESS NOTES
Patient is here for sample collection.  She did express the first amount out of the right breast and then I expressed the second sample.   YOEL Guy LPN in room for exam    Abx sent for prevention.

## 2025-06-27 ENCOUNTER — HOSPITAL ENCOUNTER (OUTPATIENT)
Dept: MAMMOGRAPHY | Facility: HOSPITAL | Age: 30
Discharge: HOME | End: 2025-06-27
Payer: COMMERCIAL

## 2025-06-27 ENCOUNTER — HOSPITAL ENCOUNTER (OUTPATIENT)
Dept: ULTRASOUND IMAGING | Facility: HOSPITAL | Age: 30
Discharge: HOME | End: 2025-06-27
Payer: COMMERCIAL

## 2025-06-27 DIAGNOSIS — N63.0 PALPABLE MASS OF BREAST: ICD-10-CM

## 2025-06-27 PROCEDURE — G0279 TOMOSYNTHESIS, MAMMO: HCPCS

## 2025-06-27 PROCEDURE — 77066 DX MAMMO INCL CAD BI: CPT

## 2025-06-27 PROCEDURE — 76642 ULTRASOUND BREAST LIMITED: CPT

## 2025-07-08 ENCOUNTER — OFFICE VISIT (OUTPATIENT)
Dept: FAMILY MEDICINE CLINIC | Facility: CLINIC | Age: 30
End: 2025-07-08
Payer: COMMERCIAL

## 2025-07-08 VITALS
HEIGHT: 65 IN | SYSTOLIC BLOOD PRESSURE: 103 MMHG | TEMPERATURE: 97.4 F | DIASTOLIC BLOOD PRESSURE: 66 MMHG | HEART RATE: 83 BPM | OXYGEN SATURATION: 100 % | RESPIRATION RATE: 16 BRPM | WEIGHT: 197.4 LBS | BODY MASS INDEX: 32.89 KG/M2

## 2025-07-08 DIAGNOSIS — Z01.419 WELL WOMAN EXAM WITH ROUTINE GYNECOLOGICAL EXAM: Primary | ICD-10-CM

## 2025-07-08 DIAGNOSIS — N63.0 LUMP IN FEMALE BREAST: ICD-10-CM

## 2025-07-08 DIAGNOSIS — Z00.00 ANNUAL PHYSICAL EXAM: ICD-10-CM

## 2025-07-08 DIAGNOSIS — N64.52 BREAST DISCHARGE: ICD-10-CM

## 2025-07-08 LAB
25(OH)D3 SERPL-MCNC: 35.6 NG/ML (ref 30–100)
ALBUMIN SERPL-MCNC: 4.1 G/DL (ref 3.5–5.2)
ALBUMIN/GLOB SERPL: 1.4 G/DL
ALP SERPL-CCNC: 62 U/L (ref 39–117)
ALT SERPL W P-5'-P-CCNC: 15 U/L (ref 1–33)
ANION GAP SERPL CALCULATED.3IONS-SCNC: 9.2 MMOL/L (ref 5–15)
AST SERPL-CCNC: 15 U/L (ref 1–32)
BACTERIAL VAGINOSIS VAG-IMP: NEGATIVE
BASOPHILS # BLD AUTO: 0.04 10*3/MM3 (ref 0–0.2)
BASOPHILS NFR BLD AUTO: 0.6 % (ref 0–1.5)
BILIRUB BLD-MCNC: NEGATIVE MG/DL
BILIRUB SERPL-MCNC: 0.2 MG/DL (ref 0–1.2)
BUN SERPL-MCNC: 9 MG/DL (ref 6–20)
BUN/CREAT SERPL: 12.5 (ref 7–25)
C TRACH DNA SPEC QL NAA+PROBE: NOT DETECTED
CALCIUM SPEC-SCNC: 9.5 MG/DL (ref 8.6–10.5)
CANDIDA DNA VAG QL NAA+PROBE: NOT DETECTED
CANDIDA DNA VAG QL NAA+PROBE: NOT DETECTED
CHLORIDE SERPL-SCNC: 105 MMOL/L (ref 98–107)
CHOLEST SERPL-MCNC: 132 MG/DL (ref 0–200)
CLARITY, POC: CLEAR
CO2 SERPL-SCNC: 24.8 MMOL/L (ref 22–29)
COLOR UR: YELLOW
CREAT SERPL-MCNC: 0.72 MG/DL (ref 0.57–1)
DEPRECATED RDW RBC AUTO: 40.1 FL (ref 37–54)
EGFRCR SERPLBLD CKD-EPI 2021: 116.2 ML/MIN/1.73
EOSINOPHIL # BLD AUTO: 0.15 10*3/MM3 (ref 0–0.4)
EOSINOPHIL NFR BLD AUTO: 2.2 % (ref 0.3–6.2)
ERYTHROCYTE [DISTWIDTH] IN BLOOD BY AUTOMATED COUNT: 11.7 % (ref 12.3–15.4)
EXPIRATION DATE: NORMAL
FERRITIN SERPL-MCNC: 99.7 NG/ML (ref 13–150)
FOLATE SERPL-MCNC: 5.75 NG/ML (ref 4.78–24.2)
GLOBULIN UR ELPH-MCNC: 2.9 GM/DL
GLUCOSE SERPL-MCNC: 71 MG/DL (ref 65–99)
GLUCOSE UR STRIP-MCNC: NEGATIVE MG/DL
HCT VFR BLD AUTO: 38.9 % (ref 34–46.6)
HDLC SERPL-MCNC: 38 MG/DL (ref 40–60)
HGB BLD-MCNC: 13 G/DL (ref 12–15.9)
IMM GRANULOCYTES # BLD AUTO: 0.04 10*3/MM3 (ref 0–0.05)
IMM GRANULOCYTES NFR BLD AUTO: 0.6 % (ref 0–0.5)
IRON 24H UR-MRATE: 99 MCG/DL (ref 37–145)
IRON SATN MFR SERPL: 32 % (ref 20–50)
KETONES UR QL: NEGATIVE
LDLC SERPL CALC-MCNC: 79 MG/DL (ref 0–100)
LDLC/HDLC SERPL: 2.09 {RATIO}
LEUKOCYTE EST, POC: NEGATIVE
LYMPHOCYTES # BLD AUTO: 1.66 10*3/MM3 (ref 0.7–3.1)
LYMPHOCYTES NFR BLD AUTO: 24.4 % (ref 19.6–45.3)
Lab: NORMAL
MCH RBC QN AUTO: 31.9 PG (ref 26.6–33)
MCHC RBC AUTO-ENTMCNC: 33.4 G/DL (ref 31.5–35.7)
MCV RBC AUTO: 95.6 FL (ref 79–97)
MONOCYTES # BLD AUTO: 0.52 10*3/MM3 (ref 0.1–0.9)
MONOCYTES NFR BLD AUTO: 7.7 % (ref 5–12)
N GONORRHOEA RRNA SPEC QL NAA+PROBE: NOT DETECTED
NEUTROPHILS NFR BLD AUTO: 4.38 10*3/MM3 (ref 1.7–7)
NEUTROPHILS NFR BLD AUTO: 64.5 % (ref 42.7–76)
NITRITE UR-MCNC: NEGATIVE MG/ML
NRBC BLD AUTO-RTO: 0 /100 WBC (ref 0–0.2)
PH UR: 6 [PH] (ref 5–8)
PLATELET # BLD AUTO: 281 10*3/MM3 (ref 140–450)
PMV BLD AUTO: 10.1 FL (ref 6–12)
POTASSIUM SERPL-SCNC: 4.2 MMOL/L (ref 3.5–5.2)
PROT SERPL-MCNC: 7 G/DL (ref 6–8.5)
PROT UR STRIP-MCNC: NEGATIVE MG/DL
RBC # BLD AUTO: 4.07 10*6/MM3 (ref 3.77–5.28)
RBC # UR STRIP: NEGATIVE /UL
SODIUM SERPL-SCNC: 139 MMOL/L (ref 136–145)
SP GR UR: 1.02 (ref 1–1.03)
T VAGINALIS DNA VAG QL NAA+PROBE: NOT DETECTED
T3FREE SERPL-MCNC: 3.03 PG/ML (ref 2–4.4)
T4 FREE SERPL-MCNC: 1.51 NG/DL (ref 0.92–1.68)
TIBC SERPL-MCNC: 310 MCG/DL (ref 298–536)
TRANSFERRIN SERPL-MCNC: 208 MG/DL (ref 200–360)
TRIGL SERPL-MCNC: 73 MG/DL (ref 0–150)
TSH SERPL DL<=0.05 MIU/L-ACNC: 0.73 UIU/ML (ref 0.27–4.2)
UROBILINOGEN UR QL: NORMAL
VIT B12 BLD-MCNC: 228 PG/ML (ref 211–946)
VLDLC SERPL-MCNC: 15 MG/DL (ref 5–40)
WBC NRBC COR # BLD AUTO: 6.79 10*3/MM3 (ref 3.4–10.8)

## 2025-07-08 PROCEDURE — 86376 MICROSOMAL ANTIBODY EACH: CPT | Performed by: NURSE PRACTITIONER

## 2025-07-08 PROCEDURE — 80061 LIPID PANEL: CPT | Performed by: NURSE PRACTITIONER

## 2025-07-08 PROCEDURE — 82607 VITAMIN B-12: CPT | Performed by: NURSE PRACTITIONER

## 2025-07-08 PROCEDURE — 82306 VITAMIN D 25 HYDROXY: CPT | Performed by: NURSE PRACTITIONER

## 2025-07-08 PROCEDURE — 83540 ASSAY OF IRON: CPT | Performed by: NURSE PRACTITIONER

## 2025-07-08 PROCEDURE — 84439 ASSAY OF FREE THYROXINE: CPT | Performed by: NURSE PRACTITIONER

## 2025-07-08 PROCEDURE — 85025 COMPLETE CBC W/AUTO DIFF WBC: CPT | Performed by: NURSE PRACTITIONER

## 2025-07-08 PROCEDURE — 87491 CHLMYD TRACH DNA AMP PROBE: CPT | Performed by: NURSE PRACTITIONER

## 2025-07-08 PROCEDURE — 84443 ASSAY THYROID STIM HORMONE: CPT | Performed by: NURSE PRACTITIONER

## 2025-07-08 PROCEDURE — 87591 N.GONORRHOEAE DNA AMP PROB: CPT | Performed by: NURSE PRACTITIONER

## 2025-07-08 PROCEDURE — G0123 SCREEN CERV/VAG THIN LAYER: HCPCS | Performed by: NURSE PRACTITIONER

## 2025-07-08 PROCEDURE — 82728 ASSAY OF FERRITIN: CPT | Performed by: NURSE PRACTITIONER

## 2025-07-08 PROCEDURE — 80053 COMPREHEN METABOLIC PANEL: CPT | Performed by: NURSE PRACTITIONER

## 2025-07-08 PROCEDURE — 84481 FREE ASSAY (FT-3): CPT | Performed by: NURSE PRACTITIONER

## 2025-07-08 PROCEDURE — 84466 ASSAY OF TRANSFERRIN: CPT | Performed by: NURSE PRACTITIONER

## 2025-07-08 PROCEDURE — 81515 NFCT DS BV&VAGINITIS DNA ALG: CPT | Performed by: NURSE PRACTITIONER

## 2025-07-08 PROCEDURE — 82746 ASSAY OF FOLIC ACID SERUM: CPT | Performed by: NURSE PRACTITIONER

## 2025-07-08 PROCEDURE — 86800 THYROGLOBULIN ANTIBODY: CPT | Performed by: NURSE PRACTITIONER

## 2025-07-08 NOTE — PROGRESS NOTES
"Subjective     Chief Complaint   Patient presents with    Gynecologic Exam       Chetna Saxena is a 29 y.o. female who presents for an annual exam. The patient has no complaints today. The patient is sexually active. GYN screening history: last pap: was normal and last mammogram: was normal. The patient wears seatbelts: yes. The patient participates in regular exercise: no.  The patient reports that there is not domestic violence in her life.     Menstrual History:  OB History          1    Para   1    Term   1       0    AB   0    Living   0         SAB   0    IAB   0    Ectopic   0    Molar   0    Multiple   0    Live Births   0               Menarche age: years ago  No LMP recorded. (Menstrual status: Tubal ligation).     History of Present Illness  The patient presents for a well-woman exam.    She was last sexually active on  and reports no associated bleeding or pain. She does not use condoms for protection. She has a history of one pregnancy. She continues to use tobacco, albeit with a reduced nicotine content, but has not been able to quit entirely. She mentions that she gained weight during her recent vacation and is now trying to lose it. She also reports experiencing some skin irritation due to sweating.    SOCIAL HISTORY  The patient admits to still utilizing tobacco with very low dose nicotine.    FAMILY HISTORY  The patient's mother has a history of thyroid issues.        The following portions of the patient's history were reviewed and updated as appropriate:vital signs, allergies, current medications, past medical history, past social history, past surgical history, and problem list    Review of Systems   Pertinent items are noted in HPI.     Objective     /66   Pulse 83   Temp 97.4 °F (36.3 °C)   Resp 16   Ht 165.1 cm (65\")   Wt 89.5 kg (197 lb 6.4 oz)   SpO2 100%   BMI 32.85 kg/m²       Physical Exam    General- NAD, alert and oriented, appropriate  Psych- Normal " mood, good memory  Neck- No masses, no thyroid enlargement  CV- Regular rhythm, no murnurs  Resp- CTA to bases, no wheezes  Abdomen- Soft, nondistended, nontender, no masses    Breast left-  Bilaterally symmetrical, no masses, nontender, no nipple discharge  Breast right- Bilaterally symmetrical, no masses, nontender, no nipple discharge    External genitalia- Normal female, no lesions  Urethra/meatus- Normal, no masses, nontender  Bladder- Normal, no masses, nontender  Vagina- Normal, no atrophy, no lesions, no discharge.  PROLAPSE : none noted   Cvx- Normal, no lesions, no discharge, No cervical motion tenderness  Uterus- Normal size, shape & consistency.  Non tender, mobile.  Adnexa- No mass, non tender  Anus/Rectum/Perineum- Normal appearance, no mass, good sphincter tone, no hemorrhoids, no prolapse    Lymphatic- No palpable neck, axillary, or groin nodes  Ext- No edema, no cyanosis    Skin- No lesions, no rashes, no acanthosis nigricans            Lab Review   Labs: Urinalysis - with micro     Imaging   No data reviewed    Last Completed Mammogram    This patient has no relevant Health Maintenance data.        Last Completed Colonoscopy    This patient has no relevant Health Maintenance data.        Last Completed Pap Smear            Awaiting Completion       PAP SMEAR (Every 3 Years) Order placed this encounter      07/08/2025  Order placed for IGP,rfx Aptima HPV All Pth by Pallavi Koch APRN    07/03/2024  IGP,rfx Aptima HPV All Pth    01/11/2018  SCANNED - PAP SMEAR    10/17/2013  Outside Procedure: CHG CYTOPATH CERV/VAG THIN LAYER                           Chief Complaint  Gynecologic Exam    Subjective          Chetna Saxena presents to [facility]  Gynecologic Exam  Pertinent negatives include no diarrhea, nausea or vomiting.       Patient or patient representative verbalized consent for the use of Ambient Listening during the visit with  Pallavi  PHYLICIA Koch for chart documentation. 7/10/2025  09:12 EDT                 Allergies  Erythromycin and Sulfamethoxazole-trimethoprim    Social History     Tobacco Use    Smoking status: Former     Current packs/day: 0.00     Average packs/day: 1 pack/day for 13.0 years (13.0 ttl pk-yrs)     Types: Cigarettes     Start date:      Quit date:      Years since quittin.5     Passive exposure: Past    Smokeless tobacco: Never    Tobacco comments:     INSTRUCTED NO SMOKING 24 HR PRIOR TO SURGERY PER ANESTHESIA ORDERS   Vaping Use    Vaping status: Every Day    Substances: Nicotine (2%), INSTRUCTED NO VAPING 24 HR PRIOR TO SURGERY PER ANESTHESIA ORDERS    Devices: Disposable   Substance Use Topics    Alcohol use: Yes     Comment: Social     Drug use: Never       Family History   Problem Relation Age of Onset    Anxiety disorder Mother     Hypothyroidism Mother     Heart disease Mother     Anxiety disorder Sister     Hypothyroidism Sister     Hypothyroidism Maternal Grandmother     Diabetes type II Paternal Grandmother     Diabetes type II Paternal Grandfather     Hyperlipidemia Paternal Grandfather     Hypertension Paternal Grandfather     Hypothyroidism Other     Malig Hyperthermia Neg Hx         There are no preventive care reminders to display for this patient.     Immunization History   Administered Date(s) Administered    COVID-19 (MODERNA) 1st,2nd,3rd Dose Monovalent 2021, 2021    DTP / HiB 1996    DTaP, Unspecified 1999    Fluzone (or Fluarix & Flulaval for VFC) >6mos 2014    Hep A, 2 Dose 2007    Hep B, Adolescent or Pediatric 1996    Influenza TIV (IM) 2014    MMR 1999, 1999    Meningococcal Conjugate 2007    OPV 1996, 1999    PEDS-Pneumococcal Conjugate (PCV7) 2014    Pneumococcal Conjugate 13-Valent (PCV13) 2014    Tdap 2021    Varicella 2001, 2007       Review of Systems  "  Constitutional:  Negative for fatigue.   Respiratory:  Negative for cough and shortness of breath.    Cardiovascular:  Negative for chest pain.   Gastrointestinal:  Negative for diarrhea, nausea and vomiting.        Objective       Vitals:    07/08/25 0844   BP: 103/66   Pulse: 83   Resp: 16   Temp: 97.4 °F (36.3 °C)   SpO2: 100%   Weight: 89.5 kg (197 lb 6.4 oz)   Height: 165.1 cm (65\")       Body mass index is 32.85 kg/m².         Physical Exam  Vitals reviewed. Exam conducted with a chaperone present.   Constitutional:       General: She is not in acute distress.     Appearance: Normal appearance. She is well-developed. She is not diaphoretic.   HENT:      Head: Normocephalic and atraumatic. Hair is normal.      Right Ear: Hearing normal. No decreased hearing noted. No drainage.      Left Ear: Hearing normal. No decreased hearing noted.      Nose: Nose normal. No nasal deformity.      Mouth/Throat:      Mouth: Mucous membranes are moist.   Eyes:      General: Lids are normal.         Right eye: No discharge.         Left eye: No discharge.      Extraocular Movements: Extraocular movements intact.      Conjunctiva/sclera: Conjunctivae normal.      Pupils: Pupils are equal, round, and reactive to light.   Neck:      Thyroid: No thyromegaly.      Vascular: No carotid bruit or JVD.   Cardiovascular:      Rate and Rhythm: Normal rate and regular rhythm.      Pulses: Normal pulses.      Heart sounds: Normal heart sounds. No murmur heard.     No friction rub. No gallop.   Pulmonary:      Effort: Pulmonary effort is normal.      Breath sounds: Normal breath sounds.   Chest:   Breasts:     Breasts are symmetrical.      Right: Normal. No mass, nipple discharge, skin change or tenderness.      Left: Normal. No mass, nipple discharge, skin change or tenderness.   Abdominal:      General: Bowel sounds are normal. There is no distension.      Palpations: Abdomen is soft. There is no mass.      Tenderness: There is no " abdominal tenderness. There is no guarding or rebound.      Hernia: No hernia is present.   Genitourinary:     General: Normal vulva.      Urethra: No urethral swelling.      Vagina: Normal. No vaginal discharge or lesions.      Cervix: Normal.      Uterus: Normal. Not tender.       Adnexa: Right adnexa normal and left adnexa normal.        Right: No tenderness.          Left: No mass.        Rectum: Normal.   Musculoskeletal:         General: No tenderness or deformity. Normal range of motion.   Lymphadenopathy:      Cervical: No cervical adenopathy.   Skin:     General: Skin is warm and dry.      Findings: No erythema or rash.   Neurological:      Mental Status: She is alert and oriented to person, place, and time.      Cranial Nerves: No cranial nerve deficit.      Motor: No abnormal muscle tone.      Coordination: Coordination normal.      Deep Tendon Reflexes: Reflexes are normal and symmetric. Reflexes normal.   Psychiatric:         Mood and Affect: Mood normal.         Behavior: Behavior normal.         Thought Content: Thought content normal.         Judgment: Judgment normal.               Result Review :     The following data was reviewed by: PHYLICIA Pat on 07/08/2025:            Mammo Diagnostic Digital Tomosynthesis Bilateral With CAD  Result Date: 6/27/2025   IMPRESSION: Benign bilateral diagnostic mammogram and targeted right breast ultrasound.    TISSUE DENSITY: There are scattered areas of fibroglandular density.  BI-RADS ASSESSMENT: BI-RADS 1. Negative.   Note: It has been reported that there is approximately a 15% false negative in mammography. Therefore, management of a palpable abnormality should not be deferred because of a negative mammogram.    6/27/2025 2:31 PM by MAVERICK BURT MD on Workstation: [id number]      US Breast Right Limited  Result Date: 6/27/2025   IMPRESSION: Benign bilateral diagnostic mammogram and targeted right breast ultrasound.    TISSUE DENSITY: There are  scattered areas of fibroglandular density.  BI-RADS ASSESSMENT: BI-RADS 1. Negative.   Note: It has been reported that there is approximately a 15% false negative in mammography. Therefore, management of a palpable abnormality should not be deferred because of a negative mammogram.    6/27/2025 2:31 PM by MAVERICK BURT MD on Workstation: [id number]      US Breast Right Limited  Result Date: 5/20/2025  No sonographic correlate to the patient's symptom of right breast lump at 5:00, 6 cm from the nipple. Recommend management of this symptom based on findings at clinical examination. I discussed results with the patient following the exam.  BI-RADS ASSESSMENT: Category 1: Negative            5/20/2025 10:11 AM by Lucinda Juan MD on Workstation: [id number]                  Assessment and Plan      Assessment & Plan  Well woman exam with routine gynecological exam         Annual physical exam         Lump in female breast         Breast discharge            Diagnosis Plan   1. Well woman exam with routine gynecological exam  POCT urinalysis dipstick, automated    Chlamydia trachomatis, Neisseria gonorrhoeae, PCR - , Cervix    IGP,rfx Aptima HPV All Pth    MVP Vaginosis Panel - Swab, Vagina      2. Annual physical exam  Comprehensive Metabolic Panel    CBC & Differential    TSH    Lipid Panel    T3, Free    T4, Free    Thyroid Antibodies    Vitamin B12 & Folate    Vitamin D,25-Hydroxy    Iron Profile w/o Ferritin    Ferritin      3. Lump in female breast  MRI Breast Bilateral Diagnostic W WO Contrast      4. Breast discharge  MRI Breast Bilateral Diagnostic W WO Contrast                Follow Up     Return in about 1 year (around 7/8/2026) for Annual physical.    Patient was given instructions and counseling regarding her condition or for health maintenance advice. Please see specific information pulled into the AVS if appropriate.     Parts of this note are electronic transcriptions/translations of spoken language to printed  text using the Dragon Dictation system.          PHYLICIA Pat  07/08/2025.     Assessment & Plan  Well woman exam with routine gynecological exam    Orders:    POCT urinalysis dipstick, automated    Chlamydia trachomatis, Neisseria gonorrhoeae, PCR - , Cervix    IGP,rfx Aptima HPV All Pth    MVP Vaginosis Panel - Swab, Vagina    Annual physical exam    Orders:    Comprehensive Metabolic Panel    CBC & Differential    TSH    Lipid Panel    T3, Free    T4, Free    Thyroid Antibodies    Vitamin B12 & Folate    Vitamin D,25-Hydroxy    Iron Profile w/o Ferritin    Ferritin    Lump in female breast    Orders:    MRI Breast Bilateral Diagnostic W WO Contrast; Future    Breast discharge    Orders:    MRI Breast Bilateral Diagnostic W WO Contrast; Future         She understands the importance of having any ordered tests to be performed in a timely fashion.  The risks of not performing them include, but are not limited to, advanced cancer stages, bone loss from osteoporosis and/or subsequent increase in morbidity and/or mortality.  She is encouraged to review her results online and/or contact our office if she has questions.   Follow up: 1 year(s)    PHYLICIA Pat  07/08/2025

## 2025-07-09 LAB
THYROGLOB AB SERPL-ACNC: <1 IU/ML (ref 0–0.9)
THYROPEROXIDASE AB SERPL-ACNC: <9 IU/ML (ref 0–34)

## 2025-07-10 ENCOUNTER — RESULTS FOLLOW-UP (OUTPATIENT)
Dept: FAMILY MEDICINE CLINIC | Facility: CLINIC | Age: 30
End: 2025-07-10
Payer: COMMERCIAL

## 2025-07-11 LAB
CONV .: NORMAL
CYTOLOGIST CVX/VAG CYTO: NORMAL
CYTOLOGY CVX/VAG DOC CYTO: NORMAL
CYTOLOGY CVX/VAG DOC THIN PREP: NORMAL
DX ICD CODE: NORMAL
OTHER STN SPEC: NORMAL
SERVICE CMNT-IMP: NORMAL
STAT OF ADQ CVX/VAG CYTO-IMP: NORMAL

## 2025-08-18 ENCOUNTER — HOSPITAL ENCOUNTER (OUTPATIENT)
Dept: MRI IMAGING | Facility: HOSPITAL | Age: 30
Discharge: HOME | End: 2025-08-18
Admitting: NURSE PRACTITIONER
Payer: COMMERCIAL

## 2025-08-18 DIAGNOSIS — N63.0 LUMP IN FEMALE BREAST: ICD-10-CM

## 2025-08-18 DIAGNOSIS — N64.52 BREAST DISCHARGE: ICD-10-CM

## 2025-08-18 PROCEDURE — 77049 MRI BREAST C-+ W/CAD BI: CPT

## 2025-08-18 PROCEDURE — A9573 GADOPICLENOL 0.5 MMOL/ML SOLUTION: HCPCS | Performed by: NURSE PRACTITIONER

## 2025-08-18 PROCEDURE — 25510000001 GADOPICLENOL 0.5 MMOL/ML SOLUTION: Performed by: NURSE PRACTITIONER

## 2025-08-18 RX ADMIN — GADOPICLENOL 7.5 ML: 485.1 INJECTION INTRAVENOUS at 12:02

## 2025-08-22 ENCOUNTER — DOCUMENTATION (OUTPATIENT)
Age: 30
End: 2025-08-22
Payer: COMMERCIAL

## 2025-08-22 DIAGNOSIS — N64.52 BREAST DISCHARGE: Primary | ICD-10-CM

## 2025-08-28 ENCOUNTER — TELEPHONE (OUTPATIENT)
Dept: SURGERY | Facility: CLINIC | Age: 30
End: 2025-08-28
Payer: COMMERCIAL

## 2025-08-29 ENCOUNTER — TELEPHONE (OUTPATIENT)
Dept: SURGERY | Facility: CLINIC | Age: 30
End: 2025-08-29
Payer: COMMERCIAL

## (undated) DEVICE — ELECTRD BLD EDGE COAT 3IN

## (undated) DEVICE — ANTIBACTERIAL UNDYED BRAIDED (POLYGLACTIN 910), SYNTHETIC ABSORBABLE SUTURE: Brand: COATED VICRYL

## (undated) DEVICE — INTENDED FOR TISSUE SEPARATION, AND OTHER PROCEDURES THAT REQUIRE A SHARP SURGICAL BLADE TO PUNCTURE OR CUT.: Brand: BARD-PARKER ® CARBON RIB-BACK BLADES

## (undated) DEVICE — DRSNG WND GZ CURAD OIL EMULSION 3X3IN STRL

## (undated) DEVICE — DEV OPN LIGASURE DISSCT EXACT 40DEG 21.6MM BX/1

## (undated) DEVICE — CONTAINER,SPECIMEN,O.R.STRL,4.5OZ: Brand: MEDLINE

## (undated) DEVICE — STERILE POLYISOPRENE POWDER-FREE SURGICAL GLOVES: Brand: PROTEXIS

## (undated) DEVICE — SOL IRR NACL 0.9PCT BT 1000ML

## (undated) DEVICE — SUT SILK 2/0 TIES 18IN A185H

## (undated) DEVICE — ANTIBACTERIAL VIOLET BRAIDED (POLYGLACTIN 910), SYNTHETIC ABSORBABLE SUTURE: Brand: COATED VICRYL

## (undated) DEVICE — GOWN,REINFRCE,POLY,SIRUS,BREATH SLV,XXLG: Brand: MEDLINE

## (undated) DEVICE — PENCL E/S SMOKEEVAC W/TELESCP CANN

## (undated) DEVICE — MAJOR-LF: Brand: MEDLINE INDUSTRIES, INC.

## (undated) DEVICE — SOL IRR NACL 0.9PCT BO 1000ML

## (undated) DEVICE — SLV SCD KN/LEN ADJ EXPRSS BLENDED MD 1P/U

## (undated) DEVICE — GLV SURG SENSICARE PI ORTHO SZ8 LF STRL

## (undated) DEVICE — GAUZE,SPONGE,4"X4",16PLY,STRL,LF,10/TRAY: Brand: MEDLINE

## (undated) DEVICE — STRIP CLS WND SUTURESTRIP/PLS 0.5X4IN TP1103

## (undated) DEVICE — STERILE POLYISOPRENE POWDER-FREE SURGICAL GLOVES WITH EMOLLIENT COATING: Brand: PROTEXIS

## (undated) DEVICE — PENCL SMOKE/EVAC MEGADYNE TELESCP 10FT

## (undated) DEVICE — APPL CHLORAPREP HI/LITE 26ML ORNG